# Patient Record
Sex: FEMALE | Race: WHITE | NOT HISPANIC OR LATINO | Employment: UNEMPLOYED | URBAN - METROPOLITAN AREA
[De-identification: names, ages, dates, MRNs, and addresses within clinical notes are randomized per-mention and may not be internally consistent; named-entity substitution may affect disease eponyms.]

---

## 2017-06-16 ENCOUNTER — HOSPITAL ENCOUNTER (EMERGENCY)
Facility: HOSPITAL | Age: 42
Discharge: HOME | End: 2017-06-16
Attending: EMERGENCY MEDICINE | Admitting: EMERGENCY MEDICINE

## 2017-06-16 VITALS
HEIGHT: 64 IN | WEIGHT: 100 LBS | RESPIRATION RATE: 16 BRPM | HEART RATE: 71 BPM | SYSTOLIC BLOOD PRESSURE: 101 MMHG | OXYGEN SATURATION: 96 % | TEMPERATURE: 98.3 F | BODY MASS INDEX: 17.07 KG/M2 | DIASTOLIC BLOOD PRESSURE: 70 MMHG

## 2017-06-16 DIAGNOSIS — M54.31 SCIATICA, RIGHT SIDE: Primary | ICD-10-CM

## 2017-06-16 PROCEDURE — 99283 EMERGENCY DEPT VISIT LOW MDM: CPT

## 2017-06-16 PROCEDURE — 63710000001 PREDNISONE PER 1 MG: Performed by: EMERGENCY MEDICINE

## 2017-06-16 RX ORDER — TRAMADOL HYDROCHLORIDE 50 MG/1
50 TABLET ORAL EVERY 6 HOURS PRN
COMMUNITY
End: 2020-06-10

## 2017-06-16 RX ORDER — CYCLOBENZAPRINE HCL 10 MG
10 TABLET ORAL 3 TIMES DAILY PRN
COMMUNITY
End: 2020-05-26

## 2017-06-16 RX ORDER — IBUPROFEN 600 MG/1
600 TABLET ORAL EVERY 6 HOURS PRN
COMMUNITY

## 2017-06-16 RX ORDER — OXYCODONE HYDROCHLORIDE AND ACETAMINOPHEN 5; 325 MG/1; MG/1
1 TABLET ORAL EVERY 4 HOURS PRN
Qty: 12 TABLET | Refills: 0 | Status: SHIPPED | OUTPATIENT
Start: 2017-06-16 | End: 2020-06-10

## 2017-06-16 RX ORDER — OXYCODONE AND ACETAMINOPHEN 7.5; 325 MG/1; MG/1
1 TABLET ORAL ONCE
Status: COMPLETED | OUTPATIENT
Start: 2017-06-16 | End: 2017-06-16

## 2017-06-16 RX ORDER — PREDNISONE 20 MG/1
40 TABLET ORAL ONCE
Status: COMPLETED | OUTPATIENT
Start: 2017-06-16 | End: 2017-06-16

## 2017-06-16 RX ADMIN — OXYCODONE HYDROCHLORIDE AND ACETAMINOPHEN 1 TABLET: 7.5; 325 TABLET ORAL at 13:22

## 2017-06-16 RX ADMIN — PREDNISONE 40 MG: 20 TABLET ORAL at 13:23

## 2017-06-16 NOTE — ED PROVIDER NOTES
Subjective   HPI Comments: Mrs. Opal Michaels is a 42 y.o. female who presents to the ED with c/o back pain with onset 2 days ago. While at work, Mrs. Michaels picked up a heavy box off the floor and felt a pop in her lower back. She then felt a burning pain that radiates around her waist and down the front part of her leg. The burning pain is now intermittent in timing. She has some weakness in her back, but no weakness or numbness in her legs. She also complains of right shoulder pain after lifting her heavy box. She denies any bladder or bowel incontinence. She was seen at [place] [facility] on the day of onset and was given Morphine and zofran for the pain and rx on tramadol and flexeril. She was seen at a  [facility] today where she could not bear any weight and told to come to [facility] .    Patient is a 42 y.o. female presenting with back pain.   History provided by:  Patient  Back Pain   Location:  Lumbar spine  Quality:  Burning  Radiates to:  R foot  Pain severity:  Moderate  Onset quality:  Sudden  Duration:  2 days  Timing:  Constant  Progression:  Unable to specify  Chronicity:  New  Context: lifting heavy objects    Relieved by:  None tried  Exacerbated by: Bearing weight.  Ineffective treatments:  None tried  Associated symptoms: no abdominal pain, no bladder incontinence, no bowel incontinence, no chest pain and no fever        Review of Systems   Constitutional: Negative for chills and fever.   HENT: Negative for congestion, rhinorrhea and sore throat.    Respiratory: Negative for cough.    Cardiovascular: Negative for chest pain.   Gastrointestinal: Negative for abdominal pain, bowel incontinence, diarrhea, nausea and vomiting.   Genitourinary: Negative for bladder incontinence.   Musculoskeletal: Positive for arthralgias (Right shoulder) and back pain (Lumbar). Negative for neck pain.   All other systems reviewed and are negative.      History reviewed. No pertinent past medical history.    No Known Allergies    Past  Surgical History:   Procedure Laterality Date   • CHOLECYSTECTOMY     • HYSTERECTOMY     • TUBAL ABDOMINAL LIGATION         History reviewed. No pertinent family history.    Social History     Social History   • Marital status:      Spouse name: N/A   • Number of children: N/A   • Years of education: N/A     Social History Main Topics   • Smoking status: Current Some Day Smoker   • Smokeless tobacco: None   • Alcohol use No   • Drug use: No   • Sexual activity: Defer     Other Topics Concern   • None     Social History Narrative   • None         Objective   Physical Exam   Constitutional: She is oriented to person, place, and time. She appears well-developed and well-nourished.   Appears uncomfortable    HENT:   Head: Normocephalic and atraumatic.   Eyes: Conjunctivae are normal.   Neck: Normal range of motion. Neck supple.   Pulmonary/Chest: Effort normal. No respiratory distress.   Abdominal: She exhibits no distension.   Musculoskeletal: Normal range of motion. She exhibits tenderness.   Tender diffusely in the right lumbosacral paraspinus area   Neurological: She is alert and oriented to person, place, and time. She has normal strength.   Skin: Skin is warm and dry.   Psychiatric: She has a normal mood and affect. Her behavior is normal.   Nursing note and vitals reviewed.      Procedures         ED Course  ED Course   Comment By Time   I spoke with the nurse practitioner who had just seen Mrs. Michaels.  She advised me that she is arranging an MRI and working on getting it approved through Worker's Comp. and did not intend for her to come to the emergency department just to get an MRI.  She advises me that her desire was for better pain control today.  She has already written a prescription for steroids and Neurontin.  She has written her for time off work.  Today is Friday and she already has made an appointment to follow her up on Monday.  I advised her of my plan to increase her tramadol up to oxycodone  and she is agreeable with that. Chase Foley MD 06/16 1321   I spoke with Mrs. Michaels and her  about diagnosis and treatment.  She tells me she feels better Chase Foley MD 06/16 1536                     MDM  Number of Diagnoses or Management Options  Sciatica, right side: new and requires workup     Amount and/or Complexity of Data Reviewed  Discuss the patient with other providers: yes    Patient Progress  Patient progress: improved      Final diagnoses:   Sciatica, right side       Documentation assistance provided by pee [facility].  Information recorded by the scribe was done at my direction and has been verified and validated by me.     Shay Luu  06/16/17 1322       Chase Foley MD  06/16/17 5027

## 2017-06-16 NOTE — DISCHARGE INSTRUCTIONS
No driving on the pain medication.  Return if difficulty controlling her bowels or bladder or any other concerns.  Follow-up with your provider on Monday as scheduled.

## 2020-05-18 ENCOUNTER — HOSPITAL ENCOUNTER (EMERGENCY)
Facility: HOSPITAL | Age: 45
Discharge: HOME | End: 2020-05-18
Attending: EMERGENCY MEDICINE | Admitting: EMERGENCY MEDICINE

## 2020-05-18 ENCOUNTER — APPOINTMENT (OUTPATIENT)
Dept: GENERAL RADIOLOGY | Facility: HOSPITAL | Age: 45
End: 2020-05-18

## 2020-05-18 VITALS
RESPIRATION RATE: 16 BRPM | DIASTOLIC BLOOD PRESSURE: 70 MMHG | OXYGEN SATURATION: 100 % | HEIGHT: 64 IN | SYSTOLIC BLOOD PRESSURE: 132 MMHG | TEMPERATURE: 98.5 F | BODY MASS INDEX: 18.78 KG/M2 | WEIGHT: 110 LBS | HEART RATE: 80 BPM

## 2020-05-18 DIAGNOSIS — R06.00 DYSPNEA, UNSPECIFIED TYPE: Primary | ICD-10-CM

## 2020-05-18 DIAGNOSIS — E87.6 HYPOKALEMIA: ICD-10-CM

## 2020-05-18 LAB
ALBUMIN SERPL-MCNC: 4.7 G/DL (ref 3.5–5.2)
ALBUMIN/GLOB SERPL: 2 G/DL
ALP SERPL-CCNC: 63 U/L (ref 39–117)
ALT SERPL W P-5'-P-CCNC: 6 U/L (ref 1–33)
ANION GAP SERPL CALCULATED.3IONS-SCNC: 11 MMOL/L (ref 5–15)
AST SERPL-CCNC: 12 U/L (ref 1–32)
BASOPHILS # BLD AUTO: 0.07 10*3/MM3 (ref 0–0.2)
BASOPHILS NFR BLD AUTO: 0.7 % (ref 0–1.5)
BILIRUB SERPL-MCNC: 0.2 MG/DL (ref 0.2–1.2)
BUN BLD-MCNC: 11 MG/DL (ref 6–20)
BUN/CREAT SERPL: 12.4 (ref 7–25)
CALCIUM SPEC-SCNC: 9.1 MG/DL (ref 8.6–10.5)
CHLORIDE SERPL-SCNC: 103 MMOL/L (ref 98–107)
CO2 SERPL-SCNC: 27 MMOL/L (ref 22–29)
CREAT BLD-MCNC: 0.89 MG/DL (ref 0.57–1)
DEPRECATED RDW RBC AUTO: 41.2 FL (ref 37–54)
EOSINOPHIL # BLD AUTO: 0.13 10*3/MM3 (ref 0–0.4)
EOSINOPHIL NFR BLD AUTO: 1.4 % (ref 0.3–6.2)
ERYTHROCYTE [DISTWIDTH] IN BLOOD BY AUTOMATED COUNT: 12.3 % (ref 12.3–15.4)
GFR SERPL CREATININE-BSD FRML MDRD: 69 ML/MIN/1.73
GLOBULIN UR ELPH-MCNC: 2.3 GM/DL
GLUCOSE BLD-MCNC: 93 MG/DL (ref 65–99)
HCT VFR BLD AUTO: 43.2 % (ref 34–46.6)
HGB BLD-MCNC: 14.1 G/DL (ref 12–15.9)
HOLD SPECIMEN: NORMAL
HOLD SPECIMEN: NORMAL
IMM GRANULOCYTES # BLD AUTO: 0.03 10*3/MM3 (ref 0–0.05)
IMM GRANULOCYTES NFR BLD AUTO: 0.3 % (ref 0–0.5)
LYMPHOCYTES # BLD AUTO: 3.52 10*3/MM3 (ref 0.7–3.1)
LYMPHOCYTES NFR BLD AUTO: 37.1 % (ref 19.6–45.3)
MCH RBC QN AUTO: 29.9 PG (ref 26.6–33)
MCHC RBC AUTO-ENTMCNC: 32.6 G/DL (ref 31.5–35.7)
MCV RBC AUTO: 91.7 FL (ref 79–97)
MONOCYTES # BLD AUTO: 0.64 10*3/MM3 (ref 0.1–0.9)
MONOCYTES NFR BLD AUTO: 6.7 % (ref 5–12)
NEUTROPHILS # BLD AUTO: 5.1 10*3/MM3 (ref 1.7–7)
NEUTROPHILS NFR BLD AUTO: 53.8 % (ref 42.7–76)
NRBC BLD AUTO-RTO: 0 /100 WBC (ref 0–0.2)
NT-PROBNP SERPL-MCNC: 48.8 PG/ML (ref 5–450)
PLATELET # BLD AUTO: 268 10*3/MM3 (ref 140–450)
PMV BLD AUTO: 11.6 FL (ref 6–12)
POTASSIUM BLD-SCNC: 3.4 MMOL/L (ref 3.5–5.2)
PROT SERPL-MCNC: 7 G/DL (ref 6–8.5)
RBC # BLD AUTO: 4.71 10*6/MM3 (ref 3.77–5.28)
SODIUM BLD-SCNC: 141 MMOL/L (ref 136–145)
TROPONIN T SERPL-MCNC: <0.01 NG/ML (ref 0–0.03)
TROPONIN T SERPL-MCNC: <0.01 NG/ML (ref 0–0.03)
WBC NRBC COR # BLD: 9.49 10*3/MM3 (ref 3.4–10.8)
WHOLE BLOOD HOLD SPECIMEN: NORMAL
WHOLE BLOOD HOLD SPECIMEN: NORMAL

## 2020-05-18 PROCEDURE — 99283 EMERGENCY DEPT VISIT LOW MDM: CPT

## 2020-05-18 PROCEDURE — 25010000002 KETOROLAC TROMETHAMINE PER 15 MG: Performed by: EMERGENCY MEDICINE

## 2020-05-18 PROCEDURE — 93005 ELECTROCARDIOGRAM TRACING: CPT | Performed by: EMERGENCY MEDICINE

## 2020-05-18 PROCEDURE — 84484 ASSAY OF TROPONIN QUANT: CPT | Performed by: EMERGENCY MEDICINE

## 2020-05-18 PROCEDURE — 80053 COMPREHEN METABOLIC PANEL: CPT | Performed by: EMERGENCY MEDICINE

## 2020-05-18 PROCEDURE — 83880 ASSAY OF NATRIURETIC PEPTIDE: CPT | Performed by: EMERGENCY MEDICINE

## 2020-05-18 PROCEDURE — 71045 X-RAY EXAM CHEST 1 VIEW: CPT

## 2020-05-18 PROCEDURE — 96374 THER/PROPH/DIAG INJ IV PUSH: CPT

## 2020-05-18 PROCEDURE — 85025 COMPLETE CBC W/AUTO DIFF WBC: CPT | Performed by: EMERGENCY MEDICINE

## 2020-05-18 RX ORDER — POTASSIUM CHLORIDE 750 MG/1
20 CAPSULE, EXTENDED RELEASE ORAL ONCE
Status: COMPLETED | OUTPATIENT
Start: 2020-05-18 | End: 2020-05-18

## 2020-05-18 RX ORDER — SODIUM CHLORIDE 0.9 % (FLUSH) 0.9 %
10 SYRINGE (ML) INJECTION AS NEEDED
Status: DISCONTINUED | OUTPATIENT
Start: 2020-05-18 | End: 2020-05-18 | Stop reason: HOSPADM

## 2020-05-18 RX ORDER — KETOROLAC TROMETHAMINE 15 MG/ML
15 INJECTION, SOLUTION INTRAMUSCULAR; INTRAVENOUS ONCE
Status: COMPLETED | OUTPATIENT
Start: 2020-05-18 | End: 2020-05-18

## 2020-05-18 RX ADMIN — KETOROLAC TROMETHAMINE 15 MG: 15 INJECTION, SOLUTION INTRAMUSCULAR; INTRAVENOUS at 04:45

## 2020-05-18 RX ADMIN — POTASSIUM CHLORIDE 20 MEQ: 10 CAPSULE, COATED, EXTENDED RELEASE ORAL at 04:04

## 2020-05-18 NOTE — DISCHARGE INSTRUCTIONS
Follow up with one of the [facility] Primary Care Providers below to setup primary care. If you need assistance coordinating a primary care appointment with a [facility] Primary Care Provider, please contact the Primary Care Coordinators at [phone] for appointment scheduling.    [facility], Primary Care   [address] , [address]   [place], [place] [address] [phone]    [facility] Internal Medicine & Endocrinology  [address], [address]  [place] [address] [phone]    [facility] Family Medicine  [address], [address]   [place] [address] [phone]    [facility] Primary Care  [address], [address]  [place], [place] [address] [phone]    [facility], Primary Care,   [address], [address]   [place], [place] [address] [phone]    [facility] Primary Care  [address]  [place], [place] [id number] [phone]    [facility], Primary Care  [address], [address]  [place], [place] [address] [phone]    [facility] Internal Medicine & Pediatrics  [address], [address]   [place] [address] [phone]    [facility], Primary Care  [address] [place] [address] [phone]      [facility] Primary Care  [address], [address]   [place] [address] [phone]    [facility] Family Medicine  [address] [place] [address] [phone]

## 2020-05-18 NOTE — ED PROVIDER NOTES
Subjective   45-year-old female presents for evaluation of shortness of breath.  She states that a few hours ago she was having a difficult time sleeping as she began experiencing shortness of breath that was worse with lying flat and improved with sitting up.  She endorses mild accompanying chest discomfort as well.  No wheezing.  No cough or fever.  She is unsure as to what may have triggered her symptoms.  She states that she has a history of anxiety and feels that this could be a contributing factor.  The chest pain is quite mild at this time and her biggest complaint is dyspnea.          Review of Systems   Constitutional: Negative for fever.   Respiratory: Positive for shortness of breath. Negative for cough and wheezing.    Cardiovascular: Positive for chest pain.   All other systems reviewed and are negative.      No past medical history on file.    No Known Allergies    Past Surgical History:   Procedure Laterality Date   • CHOLECYSTECTOMY     • HYSTERECTOMY     • TUBAL ABDOMINAL LIGATION         No family history on file.    Social History     Socioeconomic History   • Marital status:      Spouse name: Not on file   • Number of children: Not on file   • Years of education: Not on file   • Highest education level: Not on file   Tobacco Use   • Smoking status: Current Some Day Smoker   Substance and Sexual Activity   • Alcohol use: No   • Drug use: No   • Sexual activity: Defer           Objective   Physical Exam   Constitutional: She is oriented to person, place, and time. She appears well-developed and well-nourished. No distress.   Well appearing female in no acute distress   HENT:   Head: Normocephalic and atraumatic.   Mouth/Throat: Oropharynx is clear and moist.   Eyes: Pupils are equal, round, and reactive to light. EOM are normal.   Neck: Normal range of motion. Neck supple.   Cardiovascular: Normal rate, regular rhythm and normal heart sounds. Exam reveals no gallop and no friction rub.   No  murmur heard.  Pulmonary/Chest: Effort normal and breath sounds normal. No respiratory distress. She has no wheezes. She has no rales.   Abdominal: Soft. Bowel sounds are normal. She exhibits no distension and no mass. There is no tenderness. There is no rebound and no guarding.   Musculoskeletal: Normal range of motion.        Right lower leg: She exhibits no edema.        Left lower leg: She exhibits no edema.   Neurological: She is alert and oriented to person, place, and time.   Skin: Skin is warm and dry. No rash noted. She is not diaphoretic. No erythema.   Psychiatric: She has a normal mood and affect. Judgment and thought content normal.   Nursing note and vitals reviewed.      Procedures           ED Course  ED Course as of May 18 0524   Mon May 18, 2020   0300 45-year-old female presents complaining of positional shortness of breath and chest pain this morning.  On arrival to the ED, patient well-appearing.  Benign exam.  Vital signs are reassuring.  She has no cardiac risk factors.  Initial EKG revealed normal sinus rhythm with a heart rate of 78 and no ST segments suggestive of or concerning for ischemia.  Low risk Well's and PERC negative.    [DD]   0314 Chest x-ray negative.    [DD]   0324 Labs remarkable only for mild hypokalemia.  Potassium replaced orally in the ED.    [DD]   0431 HEART score of 0.    [DD]   0507 Upon reevaluation, patient improved.Repeat troponin/EKG negative/unchanged.  Doubt ACS, PE, dissection, or emergent cardiothoracic process at this time based on exam, history, clinical presentation, gestalt, objective findings in the ED, and risk stratification.  The patient will follow up with the chest pain clinic within the next 72 hours for further outpatient work-up and evaluation.  Agreeable with plan and given appropriate strict return precautions.  We discussed the high likelihood of pleuritic/musculoskeletal nature of the patient's chest pain.  We also discussed the possibility of  early pericarditis.        [DD]      ED Course User Index  [DD] Dejan, Jonh Malik MD                                   Recent Results (from the past 24 hour(s))   Light Blue Top    Collection Time: 05/18/20  2:30 AM   Result Value Ref Range    Extra Tube hold for add-on    Lavender Top    Collection Time: 05/18/20  2:30 AM   Result Value Ref Range    Extra Tube hold for add-on    CBC Auto Differential    Collection Time: 05/18/20  2:30 AM   Result Value Ref Range    WBC 9.49 3.40 - 10.80 10*3/mm3    RBC 4.71 3.77 - 5.28 10*6/mm3    Hemoglobin 14.1 12.0 - 15.9 g/dL    Hematocrit 43.2 34.0 - 46.6 %    MCV 91.7 79.0 - 97.0 fL    MCH 29.9 26.6 - 33.0 pg    MCHC 32.6 31.5 - 35.7 g/dL    RDW 12.3 12.3 - 15.4 %    RDW-SD 41.2 37.0 - 54.0 fl    MPV 11.6 6.0 - 12.0 fL    Platelets 268 140 - 450 10*3/mm3    Neutrophil % 53.8 42.7 - 76.0 %    Lymphocyte % 37.1 19.6 - 45.3 %    Monocyte % 6.7 5.0 - 12.0 %    Eosinophil % 1.4 0.3 - 6.2 %    Basophil % 0.7 0.0 - 1.5 %    Immature Grans % 0.3 0.0 - 0.5 %    Neutrophils, Absolute 5.10 1.70 - 7.00 10*3/mm3    Lymphocytes, Absolute 3.52 (H) 0.70 - 3.10 10*3/mm3    Monocytes, Absolute 0.64 0.10 - 0.90 10*3/mm3    Eosinophils, Absolute 0.13 0.00 - 0.40 10*3/mm3    Basophils, Absolute 0.07 0.00 - 0.20 10*3/mm3    Immature Grans, Absolute 0.03 0.00 - 0.05 10*3/mm3    nRBC 0.0 0.0 - 0.2 /100 WBC   Gold Top - SST    Collection Time: 05/18/20  2:56 AM   Result Value Ref Range    Extra Tube Hold for add-ons.    Comprehensive Metabolic Panel    Collection Time: 05/18/20  2:57 AM   Result Value Ref Range    Glucose 93 65 - 99 mg/dL    BUN 11 6 - 20 mg/dL    Creatinine 0.89 0.57 - 1.00 mg/dL    Sodium 141 136 - 145 mmol/L    Potassium 3.4 (L) 3.5 - 5.2 mmol/L    Chloride 103 98 - 107 mmol/L    CO2 27.0 22.0 - 29.0 mmol/L    Calcium 9.1 8.6 - 10.5 mg/dL    Total Protein 7.0 6.0 - 8.5 g/dL    Albumin 4.70 3.50 - 5.20 g/dL    ALT (SGPT) 6 1 - 33 U/L    AST (SGOT) 12 1 - 32 U/L    Alkaline  "Phosphatase 63 39 - 117 U/L    Total Bilirubin 0.2 0.2 - 1.2 mg/dL    eGFR Non African Amer 69 >60 mL/min/1.73    Globulin 2.3 gm/dL    A/G Ratio 2.0 g/dL    BUN/Creatinine Ratio 12.4 7.0 - 25.0    Anion Gap 11.0 5.0 - 15.0 mmol/L   BNP    Collection Time: 05/18/20  2:57 AM   Result Value Ref Range    proBNP 48.8 5.0 - 450.0 pg/mL   Troponin    Collection Time: 05/18/20  2:57 AM   Result Value Ref Range    Troponin T <0.010 0.000 - 0.030 ng/mL   Green Top (Gel)    Collection Time: 05/18/20  2:57 AM   Result Value Ref Range    Extra Tube Hold for add-ons.    Troponin    Collection Time: 05/18/20  4:35 AM   Result Value Ref Range    Troponin T <0.010 0.000 - 0.030 ng/mL     Note: In addition to lab results from this visit, the labs listed above may include labs taken at another facility or during a different encounter within the last 24 hours. Please correlate lab times with ED admission and discharge times for further clarification of the services performed during this visit.    XR Chest 1 View   Final Result   Hyperinflation may be due to COPD or asthma. No infiltrates or pneumothorax.      Signer Name: Nitesh Griffin MD    Signed: 5/18/2020 3:01 AM    Workstation Name: [email or web address]     [facility]        Vitals:    05/18/20 0146   BP: 140/81   BP Location: Right arm   Patient Position: Sitting   Pulse: 83   Resp: 18   Temp: 98.5 °F (36.9 °C)   TempSrc: Oral   SpO2: 99%   Weight: 49.9 kg (110 lb)   Height: 162.6 cm (64\")     Medications   sodium chloride 0.9 % flush 10 mL (has no administration in time range)   potassium chloride (MICRO-K) CR capsule 20 mEq (20 mEq Oral Given 5/18/20 7644)   ketorolac (TORADOL) injection 15 mg (15 mg Intravenous Given 5/18/20 0375)     ECG/EMG Results (last 24 hours)     Procedure Component Value Units Date/Time    ECG 12 Lead [[id number]] Collected:  05/18/20 0206     Updated:  05/18/20 0207    ECG 12 Lead [[id number]] Collected:  05/18/20 0439     Updated:  " 05/18/20 0439        ECG 12 Lead         ECG 12 Lead                     [facility]    Final diagnoses:   Dyspnea, unspecified type   Hypokalemia            Jonh Wylie MD  05/18/20 4610

## 2020-05-19 ENCOUNTER — TELEPHONE (OUTPATIENT)
Dept: FAMILY MEDICINE CLINIC | Facility: CLINIC | Age: 45
End: 2020-05-19

## 2020-05-19 NOTE — TELEPHONE ENCOUNTER
Patient needing to schedule a New Patient/ER Follow up. She was seen for shortness of breath and chest pain.     Please call patient to schedule an appt  [phone]

## 2020-05-22 NOTE — PROGRESS NOTES
[facility]  Heart and Valve Center      05/26/2020         Opal Michaels  [address] [address]  [unfilled]    1975    Provider, No Known    Opal Michaels is a 45 y.o. female.      Subjective:     Chief Complaint:  Chest Pain and Establish Care       HPI   45-year-old female with history of anxiety who presents today for evaluation of chest pain at the request of Dr. Wylie.  Patient presented the ED on 5/18 with complaints of shortness of breath that was worse with lying flat and improved when sitting up.  She notes associated mild chest discomfort.  She does have a history of anxiety feels may be a contributing factor.  Chest pain is very mild and her main concern is shortness of breath.  Evaluation in ED including EKGs and troponins were negative.  Chest x-ray negative.  Labs remarkable only for mild hypokalemia.  She reports worsening chest pain. Symptoms started months ago. Symptoms occur daily. No triggering or relieving factors. Describes as a sharp pain. Happens at rest and with activity. She still has trouble taking in a deep breath. Ibuprofen occasionally helps.   That she used to be a runner but stopped a couple months ago due to extreme exertional fatigue.  She now is afraid to exercise because of the chest pain    Cardiac risks: Tobacco abuse (quit 5/4)    There is no problem list on file for this patient.      History reviewed. No pertinent past medical history.    Past Surgical History:   Procedure Laterality Date   • CHOLECYSTECTOMY     • HYSTERECTOMY     • TUBAL ABDOMINAL LIGATION         Family History   Problem Relation Age of Onset   • Hyperlipidemia Mother    • Hypertension Mother    • Hyperlipidemia Father    • Hypertension Father    • No Known Problems Sister    • No Known Problems Brother    • Diabetes Maternal Grandmother    • Diabetes Maternal Grandfather    • Stroke Paternal Grandmother    • Cancer Paternal Grandfather        Social History      Socioeconomic History   • Marital status:      Spouse name: Not on file   • Number of children: Not on file   • Years of education: Not on file   • Highest education level: Not on file   Tobacco Use   • Smoking status: Current Some Day Smoker   • Smokeless tobacco: Never Used   Substance and Sexual Activity   • Alcohol use: No   • Drug use: No   • Sexual activity: Defer   Social History Narrative    Caffeine: 3 sodas daily       No Known Allergies      Current Outpatient Medications:   •  ARIPiprazole (ABILIFY PO), Take 4 mg by mouth Daily., Disp: , Rfl:   •  Buprenorphine HCl-Naloxone HCl (SUBOXONE SL), Place 8 mg under the tongue Daily., Disp: , Rfl:   •  buPROPion HCl (WELLBUTRIN PO), Take 150 mg by mouth Daily., Disp: , Rfl:   •  ibuprofen (ADVIL,MOTRIN) 600 MG tablet, Take 600 mg by mouth Every 6 (Six) Hours As Needed for Mild Pain (1-3)., Disp: , Rfl:   •  Varenicline Tartrate (CHANTIX PO), Take  by mouth., Disp: , Rfl:   •  oxyCODONE-acetaminophen (PERCOCET) 5-325 MG per tablet, Take 1 tablet by mouth Every 4 (Four) Hours As Needed for Severe Pain (7-10)., Disp: 12 tablet, Rfl: 0  •  traMADol (ULTRAM) 50 MG tablet, Take 50 mg by mouth Every 6 (Six) Hours As Needed for Moderate Pain (4-6)., Disp: , Rfl:     The following portions of the patient's history were reviewed today and updated as appropriate: allergies, current medications, past family history, past medical history, past social history, past surgical history and problem list     Review of Systems   Constitution: Negative for chills and fever.   HENT: Negative.    Eyes: Negative.    Cardiovascular: Positive for chest pain and dyspnea on exertion. Negative for claudication, cyanosis, irregular heartbeat, leg swelling, near-syncope, orthopnea, palpitations, paroxysmal nocturnal dyspnea and syncope.   Respiratory: Negative for cough, shortness of breath and snoring.    Endocrine: Negative.    Hematologic/Lymphatic: Does not bruise/bleed easily.  "  Skin: Negative for poor wound healing.   Musculoskeletal: Negative.    Gastrointestinal: Negative for abdominal pain, heartburn, hematemesis, melena, nausea and vomiting.   Genitourinary: Negative.  Negative for hematuria.   Neurological: Negative.    Psychiatric/Behavioral: Negative.    Allergic/Immunologic: Negative.        Objective:     Vitals:    05/26/20 0850 05/26/20 0851 05/26/20 0852   BP: 106/64 120/62 121/66   BP Location: Left arm Left arm Right arm   Patient Position: Standing Sitting Sitting   Cuff Size: Adult Adult Adult   Pulse: 76 66 70   Resp:   16   Temp:   97.8 °F (36.6 °C)   TempSrc:   Temporal   SpO2: 100% 100% 100%   Weight:   53.1 kg (117 lb 2 oz)   Height:   162.6 cm (64\")       Body mass index is 20.1 kg/m².    Physical Exam   Constitutional: She is oriented to person, place, and time. She appears well-developed and well-nourished. No distress.   HENT:   Head: Normocephalic.   Eyes: Pupils are equal, round, and reactive to light. Conjunctivae are normal.   Neck: Neck supple. No JVD present. No thyromegaly present.   Cardiovascular: Normal rate, regular rhythm, normal heart sounds and intact distal pulses. Exam reveals no gallop and no friction rub.   No murmur heard.  Pulmonary/Chest: Effort normal and breath sounds normal. No respiratory distress. She has no wheezes. She has no rales. She exhibits no tenderness.   Abdominal: Soft. Bowel sounds are normal.   Musculoskeletal: Normal range of motion. She exhibits no edema.   Neurological: She is alert and oriented to person, place, and time.   Skin: Skin is warm and dry.   Psychiatric: She has a normal mood and affect. Her behavior is normal. Thought content normal.   Vitals reviewed.      Lab and Diagnostic Review:  Results for orders placed or performed during the hospital encounter of 05/18/20   Comprehensive Metabolic Panel   Result Value Ref Range    Glucose 93 65 - 99 mg/dL    BUN 11 6 - 20 mg/dL    Creatinine 0.89 0.57 - 1.00 mg/dL "    Sodium 141 136 - 145 mmol/L    Potassium 3.4 (L) 3.5 - 5.2 mmol/L    Chloride 103 98 - 107 mmol/L    CO2 27.0 22.0 - 29.0 mmol/L    Calcium 9.1 8.6 - 10.5 mg/dL    Total Protein 7.0 6.0 - 8.5 g/dL    Albumin 4.70 3.50 - 5.20 g/dL    ALT (SGPT) 6 1 - 33 U/L    AST (SGOT) 12 1 - 32 U/L    Alkaline Phosphatase 63 39 - 117 U/L    Total Bilirubin 0.2 0.2 - 1.2 mg/dL    eGFR Non African Amer 69 >60 mL/min/1.73    Globulin 2.3 gm/dL    A/G Ratio 2.0 g/dL    BUN/Creatinine Ratio 12.4 7.0 - 25.0    Anion Gap 11.0 5.0 - 15.0 mmol/L   BNP   Result Value Ref Range    proBNP 48.8 5.0 - 450.0 pg/mL   Troponin   Result Value Ref Range    Troponin T <0.010 0.000 - 0.030 ng/mL   CBC Auto Differential   Result Value Ref Range    WBC 9.49 3.40 - 10.80 10*3/mm3    RBC 4.71 3.77 - 5.28 10*6/mm3    Hemoglobin 14.1 12.0 - 15.9 g/dL    Hematocrit 43.2 34.0 - 46.6 %    MCV 91.7 79.0 - 97.0 fL    MCH 29.9 26.6 - 33.0 pg    MCHC 32.6 31.5 - 35.7 g/dL    RDW 12.3 12.3 - 15.4 %    RDW-SD 41.2 37.0 - 54.0 fl    MPV 11.6 6.0 - 12.0 fL    Platelets 268 140 - 450 10*3/mm3    Neutrophil % 53.8 42.7 - 76.0 %    Lymphocyte % 37.1 19.6 - 45.3 %    Monocyte % 6.7 5.0 - 12.0 %    Eosinophil % 1.4 0.3 - 6.2 %    Basophil % 0.7 0.0 - 1.5 %    Immature Grans % 0.3 0.0 - 0.5 %    Neutrophils, Absolute 5.10 1.70 - 7.00 10*3/mm3    Lymphocytes, Absolute 3.52 (H) 0.70 - 3.10 10*3/mm3    Monocytes, Absolute 0.64 0.10 - 0.90 10*3/mm3    Eosinophils, Absolute 0.13 0.00 - 0.40 10*3/mm3    Basophils, Absolute 0.07 0.00 - 0.20 10*3/mm3    Immature Grans, Absolute 0.03 0.00 - 0.05 10*3/mm3    nRBC 0.0 0.0 - 0.2 /100 WBC   Troponin   Result Value Ref Range    Troponin T <0.010 0.000 - 0.030 ng/mL     CXR 5/18/20  IMPRESSION:  Hyperinflation may be due to COPD or asthma. No infiltrates or pneumothorax.    EKG 5/18/20: Normal sinus rhythm  Normal ECG  When compared with ECG of 18-MAY-2020 02:06, (Unconfirmed)  No significant change was found    Assessment and Plan:    1. Chest pain, unspecified type  LUISA risk score 0  No evidence of pericarditis on EKG. However, recommend trial of naproxen 500mg BID for 2 weeks to see if this helps  - Lipid Panel; Future  - High Sensitivity CRP; Future  - Adult Transthoracic Echo Complete W/ Cont if Necessary Per Protocol; Future    2. Shortness of breath  - Adult Transthoracic Echo Complete W/ Cont if Necessary Per Protocol; Future    3. Other fatigue  - Thyroid Panel With TSH; Future    4. Palpitations  - Magnesium; Future  - Holter Monitor - 72 Hour Up To 21 Days; Future  - Adult Transthoracic Echo Complete W/ Cont if Necessary Per Protocol; Future    Video visit in 6 weeks      It has been a pleasure to participate in the care of this patient.  Patient was instructed to call the [facility] with any questions, concerns, or worsening symptoms.    *Please note that portions of this note were completed with a voice recognition program. Efforts were made to edit the dictations, but occasionally words are mistranscribed.

## 2020-05-26 ENCOUNTER — APPOINTMENT (OUTPATIENT)
Dept: CARDIOLOGY | Facility: HOSPITAL | Age: 45
End: 2020-05-26

## 2020-05-26 ENCOUNTER — OFFICE VISIT (OUTPATIENT)
Dept: CARDIOLOGY | Facility: HOSPITAL | Age: 45
End: 2020-05-26

## 2020-05-26 ENCOUNTER — HOSPITAL ENCOUNTER (OUTPATIENT)
Dept: CARDIOLOGY | Facility: HOSPITAL | Age: 45
Discharge: HOME | End: 2020-05-26
Admitting: NURSE PRACTITIONER

## 2020-05-26 ENCOUNTER — LAB (OUTPATIENT)
Dept: LAB | Facility: HOSPITAL | Age: 45
End: 2020-05-26

## 2020-05-26 VITALS
OXYGEN SATURATION: 100 % | SYSTOLIC BLOOD PRESSURE: 121 MMHG | RESPIRATION RATE: 16 BRPM | TEMPERATURE: 97.8 F | WEIGHT: 117.13 LBS | DIASTOLIC BLOOD PRESSURE: 66 MMHG | HEIGHT: 64 IN | BODY MASS INDEX: 20 KG/M2 | HEART RATE: 70 BPM

## 2020-05-26 DIAGNOSIS — R00.2 PALPITATIONS: ICD-10-CM

## 2020-05-26 DIAGNOSIS — R06.02 SHORTNESS OF BREATH: ICD-10-CM

## 2020-05-26 DIAGNOSIS — R53.83 OTHER FATIGUE: ICD-10-CM

## 2020-05-26 DIAGNOSIS — R07.9 CHEST PAIN, UNSPECIFIED TYPE: Primary | ICD-10-CM

## 2020-05-26 DIAGNOSIS — R07.9 CHEST PAIN, UNSPECIFIED TYPE: ICD-10-CM

## 2020-05-26 LAB
CHOLEST SERPL-MCNC: 156 MG/DL (ref 0–200)
CRP SERPL-MCNC: 0.05 MG/DL (ref 0.01–0.5)
HDLC SERPL-MCNC: 67 MG/DL (ref 40–60)
LDLC SERPL CALC-MCNC: 64 MG/DL (ref 0–100)
LDLC/HDLC SERPL: 0.95 {RATIO}
MAGNESIUM SERPL-MCNC: 1.9 MG/DL (ref 1.6–2.6)
T-UPTAKE NFR SERPL: 1.12 TBI (ref 0.8–1.3)
T4 SERPL-MCNC: 5.66 MCG/DL (ref 4.5–11.7)
TRIGL SERPL-MCNC: 127 MG/DL (ref 0–150)
TSH SERPL DL<=0.05 MIU/L-ACNC: 2.48 UIU/ML (ref 0.27–4.2)
VLDLC SERPL-MCNC: 25.4 MG/DL (ref 5–40)

## 2020-05-26 PROCEDURE — 83735 ASSAY OF MAGNESIUM: CPT

## 2020-05-26 PROCEDURE — 0296T HC EXT ECG > 48HR TO 21 DAY RCRD W/CONECT INTL RCRD: CPT

## 2020-05-26 PROCEDURE — 80061 LIPID PANEL: CPT

## 2020-05-26 PROCEDURE — 36415 COLL VENOUS BLD VENIPUNCTURE: CPT

## 2020-05-26 PROCEDURE — 86141 C-REACTIVE PROTEIN HS: CPT

## 2020-05-26 PROCEDURE — 84443 ASSAY THYROID STIM HORMONE: CPT

## 2020-05-26 PROCEDURE — 99204 OFFICE O/P NEW MOD 45 MIN: CPT | Performed by: NURSE PRACTITIONER

## 2020-05-26 PROCEDURE — 84479 ASSAY OF THYROID (T3 OR T4): CPT

## 2020-05-26 PROCEDURE — 84436 ASSAY OF TOTAL THYROXINE: CPT

## 2020-05-26 NOTE — PROGRESS NOTES
[facility] Heart Monitor Documentation    Opal Michaels  1975  [id number]  05/26/20    OZ Mcintosh    [] ZIO XT Patch  Model [id number] Prescribed for N/A Days    · Serial Number: (N + 9 Digits) N   · Apply-By Date on Box:   · USPS Tracking Number:   · USPS Tracking        [] [employer] BodyGuardian MINI PLUS Mobile Cardiac Telemetry  Model BGMINIPLUS Prescribed for  Days    · Serial Number: (BGM + 7 Digits) BGM  · Shipped-By Date on Box:   · UPS Tracking Number: [id number]  · UPS Tracking      [x] [employer] BodyGuardian MINI Holter Monitor  Model BGMINIEL Prescribed for 14 Days    · Serial Number: (7 Digits) [id number]  · Shipped-By Date on Box: 03/24/2020  · UPS Tracking Number: [id number]  · UPS Tracking        This monitor was applied to the patient's chest and checked for proper functioning.  Ms. Opal Michaels was instructed in the proper use of this monitor.  She was given the opportunity to ask questions and left the office with the device 's instruction manual.    Melissa Roth CMA, 9:31 AM, 05/26/20                  [facility] 8[facility]

## 2020-05-29 ENCOUNTER — HOSPITAL ENCOUNTER (OUTPATIENT)
Dept: CARDIOLOGY | Facility: HOSPITAL | Age: 45
Discharge: HOME | End: 2020-05-29
Admitting: NURSE PRACTITIONER

## 2020-05-29 VITALS — HEIGHT: 64 IN | BODY MASS INDEX: 19.97 KG/M2 | WEIGHT: 117 LBS

## 2020-05-29 DIAGNOSIS — R00.2 PALPITATIONS: ICD-10-CM

## 2020-05-29 DIAGNOSIS — R06.02 SHORTNESS OF BREATH: ICD-10-CM

## 2020-05-29 DIAGNOSIS — R07.9 CHEST PAIN, UNSPECIFIED TYPE: ICD-10-CM

## 2020-05-29 LAB
BH CV ECHO MEAS - AI DEC SLOPE: 220.9 CM/SEC^2
BH CV ECHO MEAS - AI MAX PG: 61.5 MMHG
BH CV ECHO MEAS - AI MAX VEL: 392.1 CM/SEC
BH CV ECHO MEAS - AI P1/2T: 520 MSEC
BH CV ECHO MEAS - AO MAX PG (FULL): 1.8 MMHG
BH CV ECHO MEAS - AO MAX PG: 5.8 MMHG
BH CV ECHO MEAS - AO ROOT AREA (BSA CORRECTED): 1.7
BH CV ECHO MEAS - AO ROOT AREA: 5.4 CM^2
BH CV ECHO MEAS - AO ROOT DIAM: 2.6 CM
BH CV ECHO MEAS - AO V2 MAX: 120.7 CM/SEC
BH CV ECHO MEAS - AVA(V,A): 2.3 CM^2
BH CV ECHO MEAS - AVA(V,D): 2.3 CM^2
BH CV ECHO MEAS - BSA(HAYCOCK): 1.5 M^2
BH CV ECHO MEAS - BSA: 1.6 M^2
BH CV ECHO MEAS - BZI_BMI: 20.1 KILOGRAMS/M^2
BH CV ECHO MEAS - BZI_METRIC_HEIGHT: 162.6 CM
BH CV ECHO MEAS - BZI_METRIC_WEIGHT: 53.1 KG
BH CV ECHO MEAS - EDV(CUBED): 80.2 ML
BH CV ECHO MEAS - EDV(MOD-SP2): 46 ML
BH CV ECHO MEAS - EDV(MOD-SP4): 58 ML
BH CV ECHO MEAS - EDV(TEICH): 83.7 ML
BH CV ECHO MEAS - EF(CUBED): 55 %
BH CV ECHO MEAS - EF(MOD-BP): 62 %
BH CV ECHO MEAS - EF(MOD-SP2): 63 %
BH CV ECHO MEAS - EF(MOD-SP4): 62.1 %
BH CV ECHO MEAS - EF(TEICH): 47.1 %
BH CV ECHO MEAS - ESV(CUBED): 36.1 ML
BH CV ECHO MEAS - ESV(MOD-SP2): 17 ML
BH CV ECHO MEAS - ESV(MOD-SP4): 22 ML
BH CV ECHO MEAS - ESV(TEICH): 44.3 ML
BH CV ECHO MEAS - FS: 23.4 %
BH CV ECHO MEAS - IVS/LVPW: 0.9
BH CV ECHO MEAS - IVSD: 0.61 CM
BH CV ECHO MEAS - LA DIMENSION: 2.3 CM
BH CV ECHO MEAS - LA/AO: 0.87
BH CV ECHO MEAS - LAD MAJOR: 4.4 CM
BH CV ECHO MEAS - LAT PEAK E' VEL: 14.1 CM/SEC
BH CV ECHO MEAS - LV DIASTOLIC VOL/BSA (35-75): 37.2 ML/M^2
BH CV ECHO MEAS - LV MASS(C)D: 80.8 GRAMS
BH CV ECHO MEAS - LV MASS(C)DI: 51.9 GRAMS/M^2
BH CV ECHO MEAS - LV MAX PG: 4.1 MMHG
BH CV ECHO MEAS - LV MEAN PG: 2.2 MMHG
BH CV ECHO MEAS - LV SYSTOLIC VOL/BSA (12-30): 14.1 ML/M^2
BH CV ECHO MEAS - LV V1 MAX: 100.9 CM/SEC
BH CV ECHO MEAS - LV V1 MEAN: 69.1 CM/SEC
BH CV ECHO MEAS - LV V1 VTI: 17.3 CM
BH CV ECHO MEAS - LVIDD: 4.3 CM
BH CV ECHO MEAS - LVIDS: 3.3 CM
BH CV ECHO MEAS - LVLD AP2: 5.9 CM
BH CV ECHO MEAS - LVLD AP4: 6.2 CM
BH CV ECHO MEAS - LVLS AP2: 5 CM
BH CV ECHO MEAS - LVLS AP4: 5.1 CM
BH CV ECHO MEAS - LVOT AREA (M): 2.8 CM^2
BH CV ECHO MEAS - LVOT AREA: 2.7 CM^2
BH CV ECHO MEAS - LVOT DIAM: 1.9 CM
BH CV ECHO MEAS - LVPWD: 0.68 CM
BH CV ECHO MEAS - MED PEAK E' VEL: 10 CM/SEC
BH CV ECHO MEAS - MV DEC SLOPE: 236.8 CM/SEC^2
BH CV ECHO MEAS - MV MAX PG: 3 MMHG
BH CV ECHO MEAS - MV MEAN PG: 1.5 MMHG
BH CV ECHO MEAS - MV P1/2T MAX VEL: 87.8 CM/SEC
BH CV ECHO MEAS - MV P1/2T: 108.6 MSEC
BH CV ECHO MEAS - MV V2 MAX: 86.4 CM/SEC
BH CV ECHO MEAS - MV V2 MEAN: 57.5 CM/SEC
BH CV ECHO MEAS - MV V2 VTI: 22.3 CM
BH CV ECHO MEAS - MVA P1/2T LCG: 2.5 CM^2
BH CV ECHO MEAS - MVA(P1/2T): 2 CM^2
BH CV ECHO MEAS - MVA(VTI): 2.1 CM^2
BH CV ECHO MEAS - PA ACC SLOPE: 391.1 CM/SEC^2
BH CV ECHO MEAS - PA ACC TIME: 0.15 SEC
BH CV ECHO MEAS - PA MAX PG: 2.2 MMHG
BH CV ECHO MEAS - PA PR(ACCEL): 11.7 MMHG
BH CV ECHO MEAS - PA V2 MAX: 74.8 CM/SEC
BH CV ECHO MEAS - RAP SYSTOLE: 3 MMHG
BH CV ECHO MEAS - RVSP: 19 MMHG
BH CV ECHO MEAS - SI(CUBED): 28.3 ML/M^2
BH CV ECHO MEAS - SI(LVOT): 30.4 ML/M^2
BH CV ECHO MEAS - SI(MOD-SP2): 18.6 ML/M^2
BH CV ECHO MEAS - SI(MOD-SP4): 23.1 ML/M^2
BH CV ECHO MEAS - SI(TEICH): 25.3 ML/M^2
BH CV ECHO MEAS - SV(CUBED): 44.2 ML
BH CV ECHO MEAS - SV(LVOT): 47.3 ML
BH CV ECHO MEAS - SV(MOD-SP2): 29 ML
BH CV ECHO MEAS - SV(MOD-SP4): 36 ML
BH CV ECHO MEAS - SV(TEICH): 39.4 ML
BH CV ECHO MEAS - TAPSE (>1.6): 1.9 CM2
BH CV ECHO MEAS - TR MAX PG: 16 MMHG
BH CV ECHO MEAS - TR MAX VEL: 197.2 CM/SEC
BH CV VAS BP LEFT ARM: NORMAL MMHG
BH CV XLRA - RV BASE: 2.5 CM
BH CV XLRA - RV LENGTH: 5.9 CM
BH CV XLRA - RV MID: 1.9 CM
BH CV XLRA - TDI S': 16.4 CM/SEC
LEFT ATRIUM VOLUME INDEX: 19.3 ML/M^2
LEFT ATRIUM VOLUME: 30 ML

## 2020-05-29 PROCEDURE — 93306 TTE W/DOPPLER COMPLETE: CPT | Performed by: INTERNAL MEDICINE

## 2020-05-29 PROCEDURE — 93306 TTE W/DOPPLER COMPLETE: CPT

## 2020-06-10 ENCOUNTER — OFFICE VISIT (OUTPATIENT)
Dept: FAMILY MEDICINE CLINIC | Facility: CLINIC | Age: 45
End: 2020-06-10

## 2020-06-10 VITALS
DIASTOLIC BLOOD PRESSURE: 56 MMHG | OXYGEN SATURATION: 99 % | HEART RATE: 79 BPM | BODY MASS INDEX: 20.32 KG/M2 | HEIGHT: 64 IN | SYSTOLIC BLOOD PRESSURE: 90 MMHG | WEIGHT: 119 LBS

## 2020-06-10 DIAGNOSIS — J32.9 CHRONIC CONGESTION OF PARANASAL SINUS: ICD-10-CM

## 2020-06-10 DIAGNOSIS — Z01.419 ENCNTR FOR GYN EXAM (GENERAL) (ROUTINE) W/O ABN FINDINGS: ICD-10-CM

## 2020-06-10 DIAGNOSIS — R06.9 BREATHING PROBLEM: Primary | ICD-10-CM

## 2020-06-10 DIAGNOSIS — F33.1 MODERATE EPISODE OF RECURRENT MAJOR DEPRESSIVE DISORDER (HCC): ICD-10-CM

## 2020-06-10 PROCEDURE — 99203 OFFICE O/P NEW LOW 30 MIN: CPT | Performed by: FAMILY MEDICINE

## 2020-06-10 RX ORDER — FLUTICASONE PROPIONATE 50 MCG
2 SPRAY, SUSPENSION (ML) NASAL 2 TIMES DAILY
Qty: 9.9 ML | Refills: 0 | Status: SHIPPED | OUTPATIENT
Start: 2020-06-10 | End: 2020-06-15

## 2020-06-10 RX ORDER — BUPROPION HYDROCHLORIDE 150 MG/1
150 TABLET ORAL DAILY
COMMUNITY

## 2020-06-10 NOTE — PROGRESS NOTES
Subjective   Opal Michaels is a 45 y.o. female.     Chief Complaint   Patient presents with   • Establish Care   • breathing issues     Trouble breathing through her nose, mostly notices at night.       History of Present Illness     Other physicians currently involved in patient's care:  Eleanor Villarreal- Cardiology  Psychiatrist- Dr. Rodriguez    Acute complaints:  Opal Michaels is a 45 y.o. female who presents today to establish care.  She was seen in the ER on 5/18/2020 for shortness of breath and chest pain.  The chest pain and shortness of breath or positional, she had a benign exam with normal vital signs and no cardiac risk factors at that point.  An EKG revealed normal sinus rhythm with a heart rate of 78, chest x-ray was negative.  She followed up in the chest pain clinic, was started on naproxen 500 mg twice daily for 2 weeks and also completed a Holter monitor and echo.  She has a follow-up visit scheduled for July 7.    Stopped smoking May 5th on Chantix.    She reports that she typically has trouble breathing through her nose, most noticeable at night.    This patient is accompanied by their self who contributes to the history of their care.    The following portions of the patient's history were reviewed and updated as appropriate: allergies, current medications, past family history, past medical history, past social history, past surgical history and problem list.    Active Ambulatory Problems     Diagnosis Date Noted   • Moderate episode of recurrent major depressive disorder (CMS/HCC) 06/10/2020   • Chronic congestion of paranasal sinus 06/10/2020     Resolved Ambulatory Problems     Diagnosis Date Noted   • No Resolved Ambulatory Problems     Past Medical History:   Diagnosis Date   • Depression    • Endometriosis    • Fibromyalgia, primary    • Gall stones    • History of stomach ulcers    • Kidney stone    • PCOS (polycystic ovarian syndrome)      Past Surgical History:   Procedure Laterality  "Date   • CHOLECYSTECTOMY     • HYSTERECTOMY     • TUBAL ABDOMINAL LIGATION       Family History   Problem Relation Age of Onset   • Hyperlipidemia Mother    • Hypertension Mother    • Hyperlipidemia Father    • Hypertension Father    • No Known Problems Sister    • No Known Problems Brother    • Diabetes Maternal Grandmother    • Diabetes Maternal Grandfather    • Stroke Paternal Grandmother    • Cancer Paternal Grandfather      Social History     Socioeconomic History   • Marital status:      Spouse name: Not on file   • Number of children: Not on file   • Years of education: Not on file   • Highest education level: Not on file   Tobacco Use   • Smoking status: Former Smoker     Types: Cigarettes     Last attempt to quit: 2020     Years since quittin.1   • Smokeless tobacco: Never Used   Substance and Sexual Activity   • Alcohol use: No   • Drug use: No   • Sexual activity: Defer   Social History Narrative    Caffeine: 3 sodas daily       Review of Systems  General ROS: negative for - chills, fever or night sweats  Cardiovascular ROS: no chest pain or dyspnea on exertion  Gastrointestinal ROS: no abdominal pain, change in bowel habits, or black or bloody stools  Genito-Urinary ROS: no dysuria, trouble voiding, or hematuria    Objective   Blood pressure 90/56, pulse 79, height 162.6 cm (64.02\"), weight 54 kg (119 lb), SpO2 99 %.  Nursing note reviewed  Physical Exam  Const: NAD, A&Ox4, Pleasant, Cooperative  Eyes: EOMI, no conjunctivitis  ENT: No nasal discharge present, neck supple  Cardiac: Regular rate and rhythm, no cyanosis  Resp: Respiratory rate within normal limits, no increased work of breathing, no audible wheezing or retractions noted  GI: No distention or ascites  MSK: Motor and sensation grossly intact in bilateral upper extremities  Neurologic: CN II-XII grossly intact  Psych: Appropriate mood and behavior.  Skin: Warm, dry  Procedures  Assessment/Plan   Problem List Items Addressed This " Visit        Respiratory    Chronic congestion of paranasal sinus    Relevant Orders    Ambulatory Referral to ENT (Otolaryngology)       Other    Moderate episode of recurrent major depressive disorder (CMS/HCC)    Relevant Medications    buPROPion XL (WELLBUTRIN XL) 150 MG 24 hr tablet      Other Visit Diagnoses     Breathing problem    -  Primary    Relevant Orders    Ambulatory Referral to ENT (Otolaryngology)    Encntr for gyn exam (general) (routine) w/o abn findings        Relevant Orders    Ambulatory Referral to Gynecology          We will plan to obtain previous records both for chronic preventative care as well as those related to the current episode of care.  Any records that the patient brought with her today were reviewed personally by me during the visit today and will be scanned into the chart for posterity.    Patient Instructions   1.  Call in 2 weeks with status      Return in about 3 months (around 9/10/2020) for Annual.    Ambulatory progress note signed and attested to by Don Daley D.O.

## 2020-08-09 PROBLEM — Z01.419 WELL WOMAN EXAM: Status: ACTIVE | Noted: 2020-08-09

## 2020-09-04 ENCOUNTER — TELEPHONE (OUTPATIENT)
Dept: OBSTETRICS AND GYNECOLOGY | Facility: CLINIC | Age: 45
End: 2020-09-04

## 2020-10-19 ENCOUNTER — TELEPHONE (OUTPATIENT)
Dept: FAMILY MEDICINE CLINIC | Facility: CLINIC | Age: 45
End: 2020-10-19

## 2020-10-19 DIAGNOSIS — J32.9 CHRONIC CONGESTION OF PARANASAL SINUS: Primary | ICD-10-CM

## 2020-10-21 ENCOUNTER — TELEPHONE (OUTPATIENT)
Dept: FAMILY MEDICINE CLINIC | Facility: CLINIC | Age: 45
End: 2020-10-21

## 2020-10-21 RX ORDER — FLUTICASONE PROPIONATE 50 MCG
1 SPRAY, SUSPENSION (ML) NASAL DAILY
Qty: 9.9 ML | Refills: 11 | Status: SHIPPED | OUTPATIENT
Start: 2020-10-21

## 2021-02-10 ENCOUNTER — TELEPHONE (OUTPATIENT)
Dept: FAMILY MEDICINE CLINIC | Facility: CLINIC | Age: 46
End: 2021-02-10

## 2021-02-10 DIAGNOSIS — Z00.00 PREVENTATIVE HEALTH CARE: Primary | ICD-10-CM

## 2021-02-10 NOTE — TELEPHONE ENCOUNTER
PT IS PLANNING TO SCHEDULE HER YEARLY APPT BUT WANTS TO GET LAB WORK DONE BEFORE SHE SCHEDULES APPT.  PT REQUESTING ORDERS BE PLACED FOR LAB WORK AND A CALL BACK TO LET HER KNOW SHE CAN COME AND GET THEM DONE.

## 2021-02-12 NOTE — TELEPHONE ENCOUNTER
CC:  Columba DAI Carrasco is here today for R shoulder pain-chronic.   Medications: medications verified, no change  Refills needed today?  NO  Denies known Latex allergy or symptoms of Latex sensitivity.  Patient would like communication of their results via:    Home Phone: [phone] (home).  Okay to leave a message containing results? Yes  Tobacco history: verified    There are no preventive care reminders to display for this patient.  Patient is up to date, no discussion needed.    [facility] status addressed. Patient Active.            Called pt, received no answer. Left vm with office number given.    HUB MAY RELAY MESSAGE     Lab orders placed

## 2025-03-20 ENCOUNTER — OFFICE VISIT (OUTPATIENT)
Dept: FAMILY MEDICINE CLINIC | Facility: CLINIC | Age: 50
End: 2025-03-20
Payer: MEDICAID

## 2025-03-20 ENCOUNTER — LAB (OUTPATIENT)
Dept: LAB | Facility: HOSPITAL | Age: 50
End: 2025-03-20
Payer: MEDICAID

## 2025-03-20 VITALS
DIASTOLIC BLOOD PRESSURE: 84 MMHG | WEIGHT: 162.4 LBS | OXYGEN SATURATION: 98 % | HEART RATE: 86 BPM | HEIGHT: 64 IN | BODY MASS INDEX: 27.72 KG/M2 | SYSTOLIC BLOOD PRESSURE: 126 MMHG

## 2025-03-20 DIAGNOSIS — Z13.89 SCREENING FOR BLOOD OR PROTEIN IN URINE: ICD-10-CM

## 2025-03-20 DIAGNOSIS — N95.1 MENOPAUSAL SYMPTOMS: ICD-10-CM

## 2025-03-20 DIAGNOSIS — R63.1 INCREASED THIRST: Primary | ICD-10-CM

## 2025-03-20 DIAGNOSIS — IMO0001 SMOKING: ICD-10-CM

## 2025-03-20 DIAGNOSIS — Z13.29 SCREENING FOR ENDOCRINE DISORDER: ICD-10-CM

## 2025-03-20 DIAGNOSIS — Z00.00 PREVENTATIVE HEALTH CARE: ICD-10-CM

## 2025-03-20 DIAGNOSIS — Z11.59 ENCOUNTER FOR HEPATITIS C SCREENING TEST FOR LOW RISK PATIENT: ICD-10-CM

## 2025-03-20 DIAGNOSIS — Z13.220 SCREENING FOR HYPERLIPIDEMIA: ICD-10-CM

## 2025-03-20 DIAGNOSIS — R53.83 LETHARGY: ICD-10-CM

## 2025-03-20 DIAGNOSIS — Z13.0 SCREENING FOR DEFICIENCY ANEMIA: ICD-10-CM

## 2025-03-20 DIAGNOSIS — Z13.6 SCREENING FOR HYPERTENSION: ICD-10-CM

## 2025-03-20 DIAGNOSIS — E55.9 VITAMIN D DEFICIENCY: ICD-10-CM

## 2025-03-20 DIAGNOSIS — E56.9 VITAMIN DEFICIENCY: ICD-10-CM

## 2025-03-20 DIAGNOSIS — Z13.6 SCREENING FOR CARDIOVASCULAR CONDITION: ICD-10-CM

## 2025-03-20 DIAGNOSIS — R35.0 FREQUENT URINATION: ICD-10-CM

## 2025-03-20 PROBLEM — F17.200 SMOKING: Status: ACTIVE | Noted: 2025-03-20

## 2025-03-20 RX ORDER — ESTRADIOL 0.05 MG/D
PATCH, EXTENDED RELEASE TRANSDERMAL
COMMUNITY
Start: 2025-02-28

## 2025-03-20 RX ORDER — AMOXICILLIN 875 MG/1
TABLET, COATED ORAL
COMMUNITY
Start: 2025-03-19

## 2025-03-20 RX ORDER — PROGESTERONE 100 MG/1
1 CAPSULE ORAL DAILY
COMMUNITY
Start: 2025-02-28

## 2025-03-20 NOTE — PROGRESS NOTES
New Patient Office Visit      Patient Name: Opal Michaels  : 1975   MRN: [id number]     Chief Complaint:    Chief Complaint   Patient presents with    Establish Care       Subjective   History of Present Illness    History of Present Illness: Opal Michaels is a 50 y.o. female who is here today to establish care.    Previous primary care: She was seen for 1 visit to establish care in 2020 after ER visits for chest pain.  She did not return for follow-up and has not been seen since that time.  -Has been seeing Tara Vo in [place] for primary care since then. Has been getting pap smears and GYN care with Teresita Tian    Chronic health conditions:  S/P hysterectomy with unilateral oophorectomy at that time (endometriosis): Has not had menstrual period since that time.    Other physicians currently involved in patient's care:  Patient Care Team:  Don Daley DO as PCP - General (Family Medicine)  She had previously seen Regina Villarreal with cardiology and was seeing a psychiatrist Dr. Rodriguez.  It looks like she had a couple appointments scheduled with gynecologist Dr. De La Cruz but did not show to these.  On review of records it looks like she was seen at [facility] by Dr. Schulz for new chest pain in 2023.    Acute Concerns:  Concerned about menopause and weight gain. Symptoms started last year, started HRT which helped with night sweats and hot flashes but weight gain has continued along with very low energy, low libido. Last blood work 2024.  -Was at ~120lbs as of  into , initially estimated she has gained about 70-80lbs over that time, but it looks like it has been about 40lbs total. Does not keep a food journal, usually cooks 1 homemade meal per day no snacking. Exercise is usually walking.    This patient is accompanied by their self who contributes to the history of their care.    The following portions of the patient's history were reviewed and  updated as appropriate: allergies, current medications, past family history, past medical history, past social history, past surgical history and problem list.    Subjective      Review of Systems:   Review of Systems - See HPI and new patient paperwork scanned into chart    Past Medical History:   Past Medical History:   Diagnosis Date    Depression     Endometriosis     Fibromyalgia, primary     Gall stones     History of stomach ulcers     Kidney stone     PCOS (polycystic ovarian syndrome)        Past Surgical History:   Past Surgical History:   Procedure Laterality Date    CHOLECYSTECTOMY      HYSTERECTOMY      TUBAL ABDOMINAL LIGATION         Family History:   Family History   Problem Relation Age of Onset    Hyperlipidemia Mother     Hypertension Mother     Hepatitis Mother         FAITH    Hyperlipidemia Father     Hypertension Father     No Known Problems Sister     No Known Problems Brother     Diabetes Maternal Grandmother     Diabetes Maternal Grandfather     Stroke Paternal Grandmother     Cancer Paternal Grandfather        Social History:   Social History     Socioeconomic History    Marital status:    Tobacco Use    Smoking status: Former     Current packs/day: 0.00     Average packs/day: 1 pack/day for 25.3 years (25.3 ttl pk-yrs)     Types: Cigarettes     Start date: 1995     Quit date: 2020     Years since quittin.8    Smokeless tobacco: Never   Vaping Use    Vaping status: Never Used   Substance and Sexual Activity    Alcohol use: No    Drug use: No    Sexual activity: Defer       Social History     Social History Narrative    Caffeine: 3 sodas daily        Tobacco History:   Social History     Tobacco Use   Smoking Status Former    Current packs/day: 0.00    Average packs/day: 1 pack/day for 25.3 years (25.3 ttl pk-yrs)    Types: Cigarettes    Start date: 1995    Quit date: 2020    Years since quittin.8   Smokeless Tobacco Never       Medications:   Outpatient  "Medications Prior to Visit   Medication Sig Dispense Refill    amoxicillin (AMOXIL) 875 MG tablet  (Patient not taking: Reported on 3/27/2025)      Kristi 0.05 MG/24HR patch APPLY 1 PATCH TOPICALLY TWICE A WEEK      ibuprofen (ADVIL,MOTRIN) 600 MG tablet Take 1 tablet by mouth Every 6 (Six) Hours As Needed for Mild Pain.      Progesterone (PROMETRIUM) 100 MG capsule Take 1 capsule by mouth Daily.      ARIPiprazole (ABILIFY PO) Take 4 mg by mouth Daily. (Patient not taking: Reported on 3/20/2025)      Buprenorphine HCl-Naloxone HCl (SUBOXONE SL) Place 8 mg under the tongue Daily. (Patient not taking: Reported on 3/20/2025)      buPROPion XL (WELLBUTRIN XL) 150 MG 24 hr tablet Take 150 mg by mouth Daily. (Patient not taking: Reported on 3/20/2025)      fluticasone (Flonase) 50 MCG/ACT nasal spray 1 spray into the nostril(s) as directed by provider Daily. (Patient not taking: Reported on 3/20/2025) 9.9 mL 11    Varenicline Tartrate (CHANTIX PO) Take  by mouth. (Patient not taking: Reported on 3/20/2025)       No facility-administered medications prior to visit.        Allergies:   No Known Allergies    Objective   Objective     Physical Exam:  Vital Signs:   Vitals:    03/20/25 1302   BP: 126/84   Pulse: 86   SpO2: 98%   Weight: 73.7 kg (162 lb 6.4 oz)   Height: 162.6 cm (64.02\")     Body mass index is 27.86 kg/m².     Physical Exam  Nursing note reviewed  Const: NAD, A&Ox4, Pleasant, Cooperative  Eyes: EOMI, no conjunctivitis  ENT: No nasal discharge present, neck supple  Cardiac: Regular rate and rhythm, no cyanosis  Resp: Respiratory rate within normal limits, no increased work of breathing, no audible wheezing or retractions noted  GI: No distention or ascites  MSK: Motor and sensation grossly intact in bilateral upper extremities  Neurologic: CN II-XII grossly intact  Psych: Appropriate mood and behavior.  Skin: Warm, dry  Procedures/Radiology     Procedures  No radiology results for the last 7 days     Assessment " & Plan   Assessment / Plan      Assessment/Plan:   Problems Addressed This Visit  Diagnoses and all orders for this visit:    1. Increased thirst (Primary)  -     Lipid Panel; Future  -     High Sensitivity CRP; Future  -     Hemoglobin A1c; Future  -     Comprehensive Metabolic Panel; Future  -     CBC & Differential; Future  -     Urinalysis With Microscopic If Indicated (No Culture) - Urine, Clean Catch; Future  -     TSH Rfx On Abnormal To Free T4; Future  -     Vitamin D,25-Hydroxy; Future  -     Vitamin B12; Future  -     Hepatitis C Antibody; Future  -     Testosterone; Future  -     Progesterone; Future  -     Estrogens, Fractionated; Future  -     Insulin, Total; Future  -     Uric Acid; Future  -     Phosphorus; Future  -     Magnesium; Future  -     Vitamin B6; Future    2. Frequent urination  -     Lipid Panel; Future  -     High Sensitivity CRP; Future  -     Hemoglobin A1c; Future  -     Comprehensive Metabolic Panel; Future  -     CBC & Differential; Future  -     Urinalysis With Microscopic If Indicated (No Culture) - Urine, Clean Catch; Future  -     TSH Rfx On Abnormal To Free T4; Future  -     Vitamin D,25-Hydroxy; Future  -     Vitamin B12; Future  -     Hepatitis C Antibody; Future  -     Testosterone; Future  -     Progesterone; Future  -     Estrogens, Fractionated; Future  -     Insulin, Total; Future  -     Uric Acid; Future  -     Phosphorus; Future  -     Magnesium; Future  -     Vitamin B6; Future    3. Lethargy  -     Lipid Panel; Future  -     High Sensitivity CRP; Future  -     Hemoglobin A1c; Future  -     Comprehensive Metabolic Panel; Future  -     CBC & Differential; Future  -     Urinalysis With Microscopic If Indicated (No Culture) - Urine, Clean Catch; Future  -     TSH Rfx On Abnormal To Free T4; Future  -     Vitamin D,25-Hydroxy; Future  -     Vitamin B12; Future  -     Hepatitis C Antibody; Future  -     Testosterone; Future  -     Progesterone; Future  -     Estrogens,  Fractionated; Future  -     Insulin, Total; Future  -     Uric Acid; Future  -     Phosphorus; Future  -     Magnesium; Future  -     Vitamin B6; Future    4. Smoking  Assessment & Plan:  ~1ppd since 1997      5. Menopausal symptoms    6. Screening for hyperlipidemia  -     Lipid Panel; Future    7. Preventative health care  -     Lipid Panel; Future  -     High Sensitivity CRP; Future  -     Hemoglobin A1c; Future  -     Comprehensive Metabolic Panel; Future  -     CBC & Differential; Future  -     Urinalysis With Microscopic If Indicated (No Culture) - Urine, Clean Catch; Future  -     TSH Rfx On Abnormal To Free T4; Future  -     Vitamin D,25-Hydroxy; Future  -     Vitamin B12; Future  -     Hepatitis C Antibody; Future  -     Testosterone; Future  -     Progesterone; Future  -     Estrogens, Fractionated; Future  -     Insulin, Total; Future  -     Uric Acid; Future    8. Screening for cardiovascular condition  -     High Sensitivity CRP; Future    9. Screening for endocrine disorder  -     Hemoglobin A1c; Future  -     Comprehensive Metabolic Panel; Future  -     TSH Rfx On Abnormal To Free T4; Future  -     Testosterone; Future  -     Progesterone; Future  -     Estrogens, Fractionated; Future  -     Insulin, Total; Future    10. Screening for deficiency anemia  -     CBC & Differential; Future    11. Screening for blood or protein in urine  -     Urinalysis With Microscopic If Indicated (No Culture) - Urine, Clean Catch; Future    12. Vitamin D deficiency  -     Vitamin D,25-Hydroxy; Future    13. Vitamin deficiency  -     Vitamin B12; Future    14. Encounter for hepatitis C screening test for low risk patient  -     Hepatitis C Antibody; Future    15. Screening for hypertension  -     Uric Acid; Future      Problem List Items Addressed This Visit          Tobacco    Smoking    Current Assessment & Plan   ~1ppd since 1997          Other Visit Diagnoses         Increased thirst    -  Primary    Relevant Orders     Lipid Panel (Completed)    High Sensitivity CRP (Completed)    Hemoglobin A1c (Completed)    Comprehensive Metabolic Panel (Completed)    CBC & Differential (Completed)    Urinalysis With Microscopic If Indicated (No Culture) - Urine, Clean Catch (Completed)    TSH Rfx On Abnormal To Free T4 (Completed)    Vitamin D,25-Hydroxy (Completed)    Vitamin B12 (Completed)    Hepatitis C Antibody (Completed)    Testosterone (Completed)    Progesterone (Completed)    Estrogens, Fractionated (Completed)    Insulin, Total (Completed)    Uric Acid (Completed)    Phosphorus (Completed)    Magnesium (Completed)    Vitamin B6 (Completed)      Frequent urination        Relevant Orders    Lipid Panel (Completed)    High Sensitivity CRP (Completed)    Hemoglobin A1c (Completed)    Comprehensive Metabolic Panel (Completed)    CBC & Differential (Completed)    Urinalysis With Microscopic If Indicated (No Culture) - Urine, Clean Catch (Completed)    TSH Rfx On Abnormal To Free T4 (Completed)    Vitamin D,25-Hydroxy (Completed)    Vitamin B12 (Completed)    Hepatitis C Antibody (Completed)    Testosterone (Completed)    Progesterone (Completed)    Estrogens, Fractionated (Completed)    Insulin, Total (Completed)    Uric Acid (Completed)    Phosphorus (Completed)    Magnesium (Completed)    Vitamin B6 (Completed)      Lethargy        Relevant Orders    Lipid Panel (Completed)    High Sensitivity CRP (Completed)    Hemoglobin A1c (Completed)    Comprehensive Metabolic Panel (Completed)    CBC & Differential (Completed)    Urinalysis With Microscopic If Indicated (No Culture) - Urine, Clean Catch (Completed)    TSH Rfx On Abnormal To Free T4 (Completed)    Vitamin D,25-Hydroxy (Completed)    Vitamin B12 (Completed)    Hepatitis C Antibody (Completed)    Testosterone (Completed)    Progesterone (Completed)    Estrogens, Fractionated (Completed)    Insulin, Total (Completed)    Uric Acid (Completed)    Phosphorus (Completed)    Magnesium  (Completed)    Vitamin B6 (Completed)      Menopausal symptoms          Screening for hyperlipidemia        Relevant Orders    Lipid Panel (Completed)      Preventative health care        Relevant Orders    Lipid Panel (Completed)    High Sensitivity CRP (Completed)    Hemoglobin A1c (Completed)    Comprehensive Metabolic Panel (Completed)    CBC & Differential (Completed)    Urinalysis With Microscopic If Indicated (No Culture) - Urine, Clean Catch (Completed)    TSH Rfx On Abnormal To Free T4 (Completed)    Vitamin D,25-Hydroxy (Completed)    Vitamin B12 (Completed)    Hepatitis C Antibody (Completed)    Testosterone (Completed)    Progesterone (Completed)    Estrogens, Fractionated (Completed)    Insulin, Total (Completed)    Uric Acid (Completed)      Screening for cardiovascular condition        Relevant Orders    High Sensitivity CRP (Completed)      Screening for endocrine disorder        Relevant Orders    Hemoglobin A1c (Completed)    Comprehensive Metabolic Panel (Completed)    TSH Rfx On Abnormal To Free T4 (Completed)    Testosterone (Completed)    Progesterone (Completed)    Estrogens, Fractionated (Completed)    Insulin, Total (Completed)      Screening for deficiency anemia        Relevant Orders    CBC & Differential (Completed)      Screening for blood or protein in urine        Relevant Orders    Urinalysis With Microscopic If Indicated (No Culture) - Urine, Clean Catch (Completed)      Vitamin D deficiency        Relevant Orders    Vitamin D,25-Hydroxy (Completed)      Vitamin deficiency        Relevant Orders    Vitamin B12 (Completed)      Encounter for hepatitis C screening test for low risk patient        Relevant Orders    Hepatitis C Antibody (Completed)      Screening for hypertension        Relevant Orders    Uric Acid (Completed)            We will plan to obtain previous records both for chronic preventative care as well as those related to the current episode of care.  Any records that  the patient brought with her today were reviewed personally by me during the visit today and will be scanned into the chart for posterity.    Discussed the nature of the disease including relevant anatomy & expected clinical course, risks, complications, implications, management, safe and proper use of medications. Plan of care reviewed with patient at the conclusion of today's visit. Education was provided regarding diagnosis and management.  Patient verbalizes understanding of and agreement with management plan. Encouraged therapeutic lifestyle changes including low calorie diet with plenty of fruits and vegetables, daily exercise, medication compliance, and keeping scheduled follow up appointments with me and any other providers. Encouraged patient to have appointment for complete physical, fasting labs, appropriate screenings, and immunizations on an annual basis. Discussed extended office hours, shared call, and appropriate use of the ER. Discussed generally we do not prescribe chronic controlled substances from this office. Appropriate referrals will be made to pain management and psychiatry if needed. Stressed the importance and expectation of medical compliance with plan of care, medications, and follow up appointments.    There are no Patient Instructions on file for this visit.    Follow Up:   Return in about 1 week (around 3/27/2025) for Annual.    MDM     I spent 54 minutes caring for Opal on this date of service. This time includes time spent by me in the following activities:preparing for the visit, performing a medically appropriate examination and/or evaluation , counseling and educating the patient/family/caregiver, ordering medications, tests, or procedures, and documenting information in the medical record    DO CLAUDIO Smith RD  [facility]  [address]  [facility] [address]  Fax [phone]  Phone [phone]    Disclaimer  to patients: The 21st Century Cares Act makes medical notes like these available to patients in the interest of transparency. However, please be advised that this is still a medical document. It is intended as [id number] communication. Many sections may include medical language or jargon, abbreviations, and additional verbiage that are unfamiliar or confusing. In some ways it may come across as blunt, direct, or may be summarized in order to clearly and concisely communicate the most crucial information to medical professionals. It may also include mentions of conditions that are unlikely but considered as part of the differential diagnosis, including serious disorders. These are not always discussed at length at the time of appointment because their likelihood is so low, but may be included in a medical note to make it clear what has been considered and/or ruled out as part of a work-up. Medical documents are intended to carry relevant information, facts as evident, and the personal clinical opinion of the physician. If you have any questions regarding this medical document, please bring them to the attention of the physician at your next scheduled appointment.

## 2025-03-21 ENCOUNTER — LAB (OUTPATIENT)
Dept: LAB | Facility: HOSPITAL | Age: 50
End: 2025-03-21
Payer: MEDICAID

## 2025-03-21 DIAGNOSIS — E56.9 VITAMIN DEFICIENCY: ICD-10-CM

## 2025-03-21 DIAGNOSIS — Z13.89 SCREENING FOR BLOOD OR PROTEIN IN URINE: ICD-10-CM

## 2025-03-21 DIAGNOSIS — Z13.29 SCREENING FOR ENDOCRINE DISORDER: ICD-10-CM

## 2025-03-21 DIAGNOSIS — Z13.0 SCREENING FOR DEFICIENCY ANEMIA: ICD-10-CM

## 2025-03-21 DIAGNOSIS — Z00.00 PREVENTATIVE HEALTH CARE: ICD-10-CM

## 2025-03-21 DIAGNOSIS — Z11.59 ENCOUNTER FOR HEPATITIS C SCREENING TEST FOR LOW RISK PATIENT: ICD-10-CM

## 2025-03-21 DIAGNOSIS — Z13.220 SCREENING FOR HYPERLIPIDEMIA: ICD-10-CM

## 2025-03-21 DIAGNOSIS — R63.1 INCREASED THIRST: ICD-10-CM

## 2025-03-21 DIAGNOSIS — R53.83 LETHARGY: ICD-10-CM

## 2025-03-21 DIAGNOSIS — Z13.6 SCREENING FOR HYPERTENSION: ICD-10-CM

## 2025-03-21 DIAGNOSIS — E55.9 VITAMIN D DEFICIENCY: ICD-10-CM

## 2025-03-21 DIAGNOSIS — R35.0 FREQUENT URINATION: ICD-10-CM

## 2025-03-21 DIAGNOSIS — Z13.6 SCREENING FOR CARDIOVASCULAR CONDITION: ICD-10-CM

## 2025-03-21 LAB
25(OH)D3 SERPL-MCNC: 30.9 NG/ML (ref 30–100)
ALBUMIN SERPL-MCNC: 5.9 G/DL (ref 3.5–5.2)
ALBUMIN/GLOB SERPL: 5.4 G/DL
ALP SERPL-CCNC: 95 U/L (ref 39–117)
ALT SERPL W P-5'-P-CCNC: 9 U/L (ref 1–33)
ANION GAP SERPL CALCULATED.3IONS-SCNC: 16.5 MMOL/L (ref 5–15)
AST SERPL-CCNC: 16 U/L (ref 1–32)
BACTERIA UR QL AUTO: ABNORMAL /HPF
BASOPHILS # BLD AUTO: 0.07 10*3/MM3 (ref 0–0.2)
BASOPHILS NFR BLD AUTO: 0.7 % (ref 0–1.5)
BILIRUB SERPL-MCNC: <0.2 MG/DL (ref 0–1.2)
BILIRUB UR QL STRIP: NEGATIVE
BUN SERPL-MCNC: 15 MG/DL (ref 6–20)
BUN/CREAT SERPL: 14.6 (ref 7–25)
CALCIUM SPEC-SCNC: 9.3 MG/DL (ref 8.6–10.5)
CHLORIDE SERPL-SCNC: 101 MMOL/L (ref 98–107)
CHOLEST SERPL-MCNC: 185 MG/DL (ref 0–200)
CLARITY UR: ABNORMAL
CO2 SERPL-SCNC: 24.5 MMOL/L (ref 22–29)
COLOR UR: YELLOW
CREAT SERPL-MCNC: 1.03 MG/DL (ref 0.57–1)
CRP SERPL-MCNC: 0.82 MG/DL (ref 0.01–0.5)
DEPRECATED RDW RBC AUTO: 39.2 FL (ref 37–54)
EGFRCR SERPLBLD CKD-EPI 2021: 67.2 ML/MIN/1.73
EOSINOPHIL # BLD AUTO: 0.24 10*3/MM3 (ref 0–0.4)
EOSINOPHIL NFR BLD AUTO: 2.2 % (ref 0.3–6.2)
ERYTHROCYTE [DISTWIDTH] IN BLOOD BY AUTOMATED COUNT: 12.4 % (ref 12.3–15.4)
GLOBULIN UR ELPH-MCNC: 1.1 GM/DL
GLUCOSE SERPL-MCNC: 82 MG/DL (ref 65–99)
GLUCOSE UR STRIP-MCNC: NEGATIVE MG/DL
HBA1C MFR BLD: 5.2 % (ref 4.8–5.6)
HCT VFR BLD AUTO: 38.5 % (ref 34–46.6)
HCV AB SER QL: NORMAL
HDLC SERPL-MCNC: 47 MG/DL (ref 40–60)
HGB BLD-MCNC: 13 G/DL (ref 12–15.9)
HGB UR QL STRIP.AUTO: NEGATIVE
HYALINE CASTS UR QL AUTO: ABNORMAL /LPF
IMM GRANULOCYTES # BLD AUTO: 0.03 10*3/MM3 (ref 0–0.05)
IMM GRANULOCYTES NFR BLD AUTO: 0.3 % (ref 0–0.5)
KETONES UR QL STRIP: ABNORMAL
LDLC SERPL CALC-MCNC: 113 MG/DL (ref 0–100)
LDLC/HDLC SERPL: 2.33 {RATIO}
LEUKOCYTE ESTERASE UR QL STRIP.AUTO: ABNORMAL
LYMPHOCYTES # BLD AUTO: 3.37 10*3/MM3 (ref 0.7–3.1)
LYMPHOCYTES NFR BLD AUTO: 31.3 % (ref 19.6–45.3)
MAGNESIUM SERPL-MCNC: 1.7 MG/DL (ref 1.6–2.6)
MCH RBC QN AUTO: 29.3 PG (ref 26.6–33)
MCHC RBC AUTO-ENTMCNC: 33.8 G/DL (ref 31.5–35.7)
MCV RBC AUTO: 86.9 FL (ref 79–97)
MONOCYTES # BLD AUTO: 0.69 10*3/MM3 (ref 0.1–0.9)
MONOCYTES NFR BLD AUTO: 6.4 % (ref 5–12)
NEUTROPHILS NFR BLD AUTO: 59.1 % (ref 42.7–76)
NEUTROPHILS NFR BLD AUTO: 6.36 10*3/MM3 (ref 1.7–7)
NITRITE UR QL STRIP: POSITIVE
NRBC BLD AUTO-RTO: 0 /100 WBC (ref 0–0.2)
PH UR STRIP.AUTO: 6 [PH] (ref 5–8)
PHOSPHATE SERPL-MCNC: 2.4 MG/DL (ref 2.5–4.5)
PLATELET # BLD AUTO: 313 10*3/MM3 (ref 140–450)
PMV BLD AUTO: 11.6 FL (ref 6–12)
POTASSIUM SERPL-SCNC: 3.7 MMOL/L (ref 3.5–5.2)
PROGEST SERPL-MCNC: 4.79 NG/ML
PROT SERPL-MCNC: 7 G/DL (ref 6–8.5)
PROT UR QL STRIP: ABNORMAL
RBC # BLD AUTO: 4.43 10*6/MM3 (ref 3.77–5.28)
RBC # UR STRIP: ABNORMAL /HPF
REF LAB TEST METHOD: ABNORMAL
SODIUM SERPL-SCNC: 142 MMOL/L (ref 136–145)
SP GR UR STRIP: 1.02 (ref 1–1.03)
SQUAMOUS #/AREA URNS HPF: ABNORMAL /HPF
TESTOST SERPL-MCNC: 67.6 NG/DL (ref 8.4–48.1)
TRIGL SERPL-MCNC: 142 MG/DL (ref 0–150)
TSH SERPL DL<=0.05 MIU/L-ACNC: 2.51 UIU/ML (ref 0.27–4.2)
URATE SERPL-MCNC: 6 MG/DL (ref 2.4–5.7)
UROBILINOGEN UR QL STRIP: ABNORMAL
VIT B12 BLD-MCNC: >2000 PG/ML (ref 211–946)
VLDLC SERPL-MCNC: 25 MG/DL (ref 5–40)
WBC # UR STRIP: ABNORMAL /HPF
WBC NRBC COR # BLD AUTO: 10.76 10*3/MM3 (ref 3.4–10.8)

## 2025-03-21 PROCEDURE — 80061 LIPID PANEL: CPT

## 2025-03-21 PROCEDURE — 82306 VITAMIN D 25 HYDROXY: CPT

## 2025-03-21 PROCEDURE — 84144 ASSAY OF PROGESTERONE: CPT

## 2025-03-21 PROCEDURE — 85025 COMPLETE CBC W/AUTO DIFF WBC: CPT

## 2025-03-21 PROCEDURE — 84550 ASSAY OF BLOOD/URIC ACID: CPT

## 2025-03-21 PROCEDURE — 82607 VITAMIN B-12: CPT

## 2025-03-21 PROCEDURE — 84207 ASSAY OF VITAMIN B-6: CPT

## 2025-03-21 PROCEDURE — 83735 ASSAY OF MAGNESIUM: CPT

## 2025-03-21 PROCEDURE — 82679 ASSAY OF ESTRONE: CPT

## 2025-03-21 PROCEDURE — 84443 ASSAY THYROID STIM HORMONE: CPT

## 2025-03-21 PROCEDURE — 83525 ASSAY OF INSULIN: CPT

## 2025-03-21 PROCEDURE — 80053 COMPREHEN METABOLIC PANEL: CPT

## 2025-03-21 PROCEDURE — 83036 HEMOGLOBIN GLYCOSYLATED A1C: CPT

## 2025-03-21 PROCEDURE — 84100 ASSAY OF PHOSPHORUS: CPT

## 2025-03-21 PROCEDURE — 86141 C-REACTIVE PROTEIN HS: CPT

## 2025-03-21 PROCEDURE — 82670 ASSAY OF TOTAL ESTRADIOL: CPT

## 2025-03-21 PROCEDURE — 81001 URINALYSIS AUTO W/SCOPE: CPT

## 2025-03-21 PROCEDURE — 84403 ASSAY OF TOTAL TESTOSTERONE: CPT

## 2025-03-21 PROCEDURE — 86803 HEPATITIS C AB TEST: CPT

## 2025-03-23 LAB — INSULIN SERPL-ACNC: 8.8 UIU/ML (ref 2.6–24.9)

## 2025-03-25 LAB
ESTRADIOL SERPL-MCNC: 109 PG/ML
ESTRONE SERPL-MCNC: 92 PG/ML

## 2025-03-26 LAB — PYRIDOXAL PHOS SERPL-MCNC: 4.8 UG/L (ref 3.4–65.2)

## 2025-03-27 ENCOUNTER — OFFICE VISIT (OUTPATIENT)
Dept: FAMILY MEDICINE CLINIC | Facility: CLINIC | Age: 50
End: 2025-03-27
Payer: MEDICAID

## 2025-03-27 ENCOUNTER — LAB (OUTPATIENT)
Dept: LAB | Facility: HOSPITAL | Age: 50
End: 2025-03-27
Payer: MEDICAID

## 2025-03-27 ENCOUNTER — TELEPHONE (OUTPATIENT)
Dept: FAMILY MEDICINE CLINIC | Facility: CLINIC | Age: 50
End: 2025-03-27

## 2025-03-27 ENCOUNTER — PATIENT ROUNDING (BHMG ONLY) (OUTPATIENT)
Dept: FAMILY MEDICINE CLINIC | Facility: CLINIC | Age: 50
End: 2025-03-27
Payer: MEDICAID

## 2025-03-27 VITALS
HEART RATE: 93 BPM | BODY MASS INDEX: 27.52 KG/M2 | SYSTOLIC BLOOD PRESSURE: 114 MMHG | HEIGHT: 64 IN | OXYGEN SATURATION: 99 % | DIASTOLIC BLOOD PRESSURE: 80 MMHG | WEIGHT: 161.2 LBS

## 2025-03-27 DIAGNOSIS — R05.3 PERSISTENT COUGH FOR 3 WEEKS OR LONGER: ICD-10-CM

## 2025-03-27 DIAGNOSIS — R79.82 ELEVATED C-REACTIVE PROTEIN (CRP): ICD-10-CM

## 2025-03-27 DIAGNOSIS — N95.1 PERIMENOPAUSAL SYMPTOM: Primary | ICD-10-CM

## 2025-03-27 DIAGNOSIS — R63.1 INCREASED THIRST: ICD-10-CM

## 2025-03-27 DIAGNOSIS — K21.9 GASTROESOPHAGEAL REFLUX DISEASE WITHOUT ESOPHAGITIS: ICD-10-CM

## 2025-03-27 DIAGNOSIS — R79.89 HIGH SERUM VITAMIN B12: ICD-10-CM

## 2025-03-27 DIAGNOSIS — R61 NIGHT SWEATS: ICD-10-CM

## 2025-03-27 DIAGNOSIS — R53.83 LETHARGY: ICD-10-CM

## 2025-03-27 DIAGNOSIS — R14.0 ABDOMINAL BLOATING: ICD-10-CM

## 2025-03-27 DIAGNOSIS — N95.1 PERIMENOPAUSAL SYMPTOM: ICD-10-CM

## 2025-03-27 DIAGNOSIS — IMO0001 SMOKING: ICD-10-CM

## 2025-03-27 LAB
ALBUMIN SERPL-MCNC: 5 G/DL (ref 3.5–5.2)
ALBUMIN/GLOB SERPL: 1.5 G/DL
ALP SERPL-CCNC: 116 U/L (ref 39–117)
ALT SERPL W P-5'-P-CCNC: 11 U/L (ref 1–33)
ANION GAP SERPL CALCULATED.3IONS-SCNC: 17.6 MMOL/L (ref 5–15)
AST SERPL-CCNC: 17 U/L (ref 1–32)
BASOPHILS # BLD AUTO: 0.1 10*3/MM3 (ref 0–0.2)
BASOPHILS NFR BLD AUTO: 0.8 % (ref 0–1.5)
BILIRUB SERPL-MCNC: 0.3 MG/DL (ref 0–1.2)
BUN SERPL-MCNC: 13 MG/DL (ref 6–20)
BUN/CREAT SERPL: 12.6 (ref 7–25)
C3 SERPL-MCNC: 178 MG/DL (ref 82–167)
C4 SERPL-MCNC: 23 MG/DL (ref 14–44)
CALCIUM SPEC-SCNC: 10.1 MG/DL (ref 8.6–10.5)
CHLORIDE SERPL-SCNC: 100 MMOL/L (ref 98–107)
CHROMATIN AB SERPL-ACNC: <10 IU/ML (ref 0–14)
CO2 SERPL-SCNC: 24.4 MMOL/L (ref 22–29)
CORTIS SERPL-MCNC: 16.2 MCG/DL
CREAT SERPL-MCNC: 1.03 MG/DL (ref 0.57–1)
CRP SERPL-MCNC: 0.97 MG/DL (ref 0–0.5)
DEPRECATED RDW RBC AUTO: 39.1 FL (ref 37–54)
EGFRCR SERPLBLD CKD-EPI 2021: 67.2 ML/MIN/1.73
EOSINOPHIL # BLD AUTO: 0.27 10*3/MM3 (ref 0–0.4)
EOSINOPHIL NFR BLD AUTO: 2.3 % (ref 0.3–6.2)
ERYTHROCYTE [DISTWIDTH] IN BLOOD BY AUTOMATED COUNT: 12.7 % (ref 12.3–15.4)
ERYTHROCYTE [SEDIMENTATION RATE] IN BLOOD: 27 MM/HR (ref 0–20)
FSH SERPL-ACNC: 30.5 MIU/ML
GLOBULIN UR ELPH-MCNC: 3.3 GM/DL
GLUCOSE SERPL-MCNC: 83 MG/DL (ref 65–99)
HCT VFR BLD AUTO: 43.7 % (ref 34–46.6)
HGB BLD-MCNC: 15.2 G/DL (ref 12–15.9)
IMM GRANULOCYTES # BLD AUTO: 0.05 10*3/MM3 (ref 0–0.05)
IMM GRANULOCYTES NFR BLD AUTO: 0.4 % (ref 0–0.5)
LH SERPL-ACNC: 33 MIU/ML
LYMPHOCYTES # BLD AUTO: 3.7 10*3/MM3 (ref 0.7–3.1)
LYMPHOCYTES NFR BLD AUTO: 31.4 % (ref 19.6–45.3)
MCH RBC QN AUTO: 30 PG (ref 26.6–33)
MCHC RBC AUTO-ENTMCNC: 34.8 G/DL (ref 31.5–35.7)
MCV RBC AUTO: 86.4 FL (ref 79–97)
MONOCYTES # BLD AUTO: 0.7 10*3/MM3 (ref 0.1–0.9)
MONOCYTES NFR BLD AUTO: 5.9 % (ref 5–12)
NEUTROPHILS NFR BLD AUTO: 59.2 % (ref 42.7–76)
NEUTROPHILS NFR BLD AUTO: 6.96 10*3/MM3 (ref 1.7–7)
NRBC BLD AUTO-RTO: 0 /100 WBC (ref 0–0.2)
PLATELET # BLD AUTO: 338 10*3/MM3 (ref 140–450)
PMV BLD AUTO: 12.1 FL (ref 6–12)
POTASSIUM SERPL-SCNC: 4 MMOL/L (ref 3.5–5.2)
PROGEST SERPL-MCNC: 2.52 NG/ML
PROLACTIN SERPL-MCNC: 4.94 NG/ML (ref 4.79–23.3)
PROT SERPL-MCNC: 8.3 G/DL (ref 6–8.5)
RBC # BLD AUTO: 5.06 10*6/MM3 (ref 3.77–5.28)
SODIUM SERPL-SCNC: 142 MMOL/L (ref 136–145)
URATE SERPL-MCNC: 6.3 MG/DL (ref 2.4–5.7)
WBC NRBC COR # BLD AUTO: 11.78 10*3/MM3 (ref 3.4–10.8)

## 2025-03-27 PROCEDURE — 84144 ASSAY OF PROGESTERONE: CPT

## 2025-03-27 PROCEDURE — 86235 NUCLEAR ANTIGEN ANTIBODY: CPT

## 2025-03-27 PROCEDURE — 82670 ASSAY OF TOTAL ESTRADIOL: CPT

## 2025-03-27 PROCEDURE — 86160 COMPLEMENT ANTIGEN: CPT

## 2025-03-27 PROCEDURE — 86255 FLUORESCENT ANTIBODY SCREEN: CPT

## 2025-03-27 PROCEDURE — 82533 TOTAL CORTISOL: CPT

## 2025-03-27 PROCEDURE — 85025 COMPLETE CBC W/AUTO DIFF WBC: CPT

## 2025-03-27 PROCEDURE — 80053 COMPREHEN METABOLIC PANEL: CPT

## 2025-03-27 PROCEDURE — 83002 ASSAY OF GONADOTROPIN (LH): CPT

## 2025-03-27 PROCEDURE — 84550 ASSAY OF BLOOD/URIC ACID: CPT

## 2025-03-27 PROCEDURE — 84146 ASSAY OF PROLACTIN: CPT

## 2025-03-27 PROCEDURE — 86038 ANTINUCLEAR ANTIBODIES: CPT

## 2025-03-27 PROCEDURE — 83001 ASSAY OF GONADOTROPIN (FSH): CPT

## 2025-03-27 PROCEDURE — 84403 ASSAY OF TOTAL TESTOSTERONE: CPT

## 2025-03-27 PROCEDURE — 86431 RHEUMATOID FACTOR QUANT: CPT

## 2025-03-27 PROCEDURE — 84402 ASSAY OF FREE TESTOSTERONE: CPT

## 2025-03-27 PROCEDURE — 86140 C-REACTIVE PROTEIN: CPT

## 2025-03-27 PROCEDURE — 82679 ASSAY OF ESTRONE: CPT

## 2025-03-27 PROCEDURE — 85652 RBC SED RATE AUTOMATED: CPT

## 2025-03-27 PROCEDURE — 86225 DNA ANTIBODY NATIVE: CPT

## 2025-03-27 RX ORDER — OMEPRAZOLE 40 MG/1
40 CAPSULE, DELAYED RELEASE ORAL DAILY
Qty: 30 CAPSULE | Refills: 0 | Status: SHIPPED | OUTPATIENT
Start: 2025-03-27 | End: 2025-03-27 | Stop reason: SDUPTHER

## 2025-03-27 RX ORDER — OMEPRAZOLE 40 MG/1
40 CAPSULE, DELAYED RELEASE ORAL DAILY
Qty: 30 CAPSULE | Refills: 0 | Status: SHIPPED | OUTPATIENT
Start: 2025-03-27

## 2025-03-27 NOTE — TELEPHONE ENCOUNTER
Caller: Opal Michaels    Relationship: Self    Best call back number:  [phone] (Home)     Who are you requesting to speak with (clinical staff, provider,  specific staff member): CLINICAL       What was the call regarding: PATIENT SAID SHE IS ON A HIGH DOSE OF ANTIBIOTIC AND SHE TALKED WITH DR GALINDO THIS MORNING ABOUT SENDING IN DIFLUCAN FOR A YEAST INFECTION    PLEASE TO   [facility] [place]

## 2025-03-27 NOTE — PATIENT INSTRUCTIONS
Change red bull to caffeine tab 100mg to get b12 down  Other labs as ordered for inflammation/hormones  Start reflux pill  Imaging today

## 2025-03-27 NOTE — PROGRESS NOTES
Established Patient Office Visit      Patient Name: Opal Michaels  : 1975   MRN: [id number]   Care Team: Patient Care Team:  Don Daley DO as PCP - General (Family Medicine)    Chief Complaint:    Chief Complaint   Patient presents with    increased thirst     1 wk follow up, pt states she is still having symptoms    Pain       History of Present Illness: Opal Michaels is a 50 y.o. female who is here today for chief complaint.      Follow up on bloodwork  Symptoms are: chronic.   Onset was 1 to 5 years.   Symptoms occur: constantly.  Symptoms include: abdominal pain, joint pain, cough, fatigue, headaches, myalgias, dysuria, visual change and weakness.   Pertinent negative symptoms include no anorexia, no diaphoresis, no fever, no nausea, no neck pain, no numbness, no rash, no vertigo and no vomiting.   Treatment and/or Medications comments include: On file       Pain under both breasts, right flank pain  Was supposed to start amox 875mg from dentist but held off bc of labs, never ended up taking it at all.    Hysterectomy, 1 oveary left plus cervical stump    B12 very high. Used to take B12 supplement, doesn't any more, but does drink a red bull every day    Elev CRP, but had root canal 1 week prior    CT chest due   Smoking 10 cigarettes per day since , ~30 pack-year. Nighttime chronic cough (worse when lying on right side, so may be reflux related rekha with weight gain). Cough has been present for over 3 months despite abx, empiric treatment, steroid.    On Kristi patch plus progesterone 100mg caps daily    This patient is accompanied by their self who contributes to the history of their care.    The following portions of the patient's history were reviewed and updated as appropriate: allergies, current medications, past family history, past medical history, past social history, past surgical history and problem list.    Subjective      Review of Systems:   Review of Systems    Constitutional:  Positive for fatigue. Negative for diaphoresis and fever.   Respiratory:  Positive for cough.    Gastrointestinal:  Positive for abdominal pain. Negative for anorexia, nausea and vomiting.   Genitourinary:  Positive for dysuria.   Musculoskeletal:  Positive for joint pain and myalgias. Negative for neck pain.   Skin:  Negative for rash.   Neurological:  Positive for weakness. Negative for vertigo and numbness.    - See HPI    Past Medical History:   Past Medical History:   Diagnosis Date    Depression     Endometriosis     Fibromyalgia, primary     Gall stones     History of stomach ulcers     Kidney stone     PCOS (polycystic ovarian syndrome)        Past Surgical History:   Past Surgical History:   Procedure Laterality Date    CHOLECYSTECTOMY      HYSTERECTOMY      TUBAL ABDOMINAL LIGATION         Family History:   Family History   Problem Relation Age of Onset    Hyperlipidemia Mother     Hypertension Mother     Hepatitis Mother         FAITH    Hyperlipidemia Father     Hypertension Father     No Known Problems Sister     No Known Problems Brother     Diabetes Maternal Grandmother     Diabetes Maternal Grandfather     Stroke Paternal Grandmother     Cancer Paternal Grandfather        Social History:   Social History     Socioeconomic History    Marital status:    Tobacco Use    Smoking status: Former     Current packs/day: 0.00     Average packs/day: 1 pack/day for 25.3 years (25.3 ttl pk-yrs)     Types: Cigarettes     Start date: 1995     Quit date: 2020     Years since quittin.8    Smokeless tobacco: Never   Vaping Use    Vaping status: Never Used   Substance and Sexual Activity    Alcohol use: No    Drug use: No    Sexual activity: Defer       Tobacco History:   Social History     Tobacco Use   Smoking Status Former    Current packs/day: 0.00    Average packs/day: 1 pack/day for 25.3 years (25.3 ttl pk-yrs)    Types: Cigarettes    Start date: 1995    Quit date:  "2020    Years since quittin.8   Smokeless Tobacco Never       Medications:   Outpatient Medications Prior to Visit   Medication Sig Dispense Refill    Kristi 0.05 MG/24HR patch APPLY 1 PATCH TOPICALLY TWICE A WEEK      ibuprofen (ADVIL,MOTRIN) 600 MG tablet Take 1 tablet by mouth Every 6 (Six) Hours As Needed for Mild Pain.      Progesterone (PROMETRIUM) 100 MG capsule Take 1 capsule by mouth Daily.      amoxicillin (AMOXIL) 875 MG tablet  (Patient not taking: Reported on 3/27/2025)       No facility-administered medications prior to visit.        Allergies:   No Known Allergies    Objective   Objective     Physical Exam:  Vital Signs:   Vitals:    25 1009   BP: 114/80   Pulse: 93   SpO2: 99%   Weight: 73.1 kg (161 lb 3.2 oz)   Height: 162.6 cm (64.02\")     Body mass index is 27.66 kg/m².     Physical Exam  Nursing note reviewed  Const: NAD, A&Ox4, Pleasant, Cooperative  Eyes: EOMI, no conjunctivitis  ENT: No nasal discharge present, neck supple  Cardiac: Regular rate and rhythm, no cyanosis  Resp: Respiratory rate within normal limits, no increased work of breathing, no audible wheezing or retractions noted  GI: No distention or ascites  MSK: Motor and sensation grossly intact in bilateral upper extremities  Neurologic: CN II-XII grossly intact  Psych: Appropriate mood and behavior.  Skin: Warm, dry  Procedures/Radiology     Procedures  No radiology results for the last 7 days     Assessment & Plan   Assessment / Plan      Assessment/Plan:   Problems Addressed This Visit  Diagnoses and all orders for this visit:    1. Perimenopausal symptom (Primary)  -     Progesterone; Future    2. Elevated C-reactive protein (CRP)  -     CT Chest Without Contrast; Future  -     CT Abdomen Pelvis With & Without Contrast; Future  -     Prolactin; Future  -     Testosterone (Free & Total), LC / MS; Future  -     Estrogens, Fractionated; Future  -     FSH & LH; Future  -     Progesterone; Future  -     Cortisol; " Future  -     dsDNA Antibody by IFA, Crithidia luciliae, with Reflex to Titer; Future  -     FAMILIA by IFA, Reflex 9-biomarkers profile; Future  -     Anti-Smith Antibody; Future  -     C-reactive Protein; Future  -     Sedimentation Rate; Future  -     Uric Acid; Future  -     Rheumatoid Factor; Future  -     C4+C3; Future  -     CBC & Differential; Future  -     Comprehensive Metabolic Panel; Future    3. High serum vitamin B12    4. Lethargy  -     Prolactin; Future  -     Testosterone (Free & Total), LC / MS; Future  -     Estrogens, Fractionated; Future  -     FSH & LH; Future  -     Progesterone; Future  -     Cortisol; Future  -     dsDNA Antibody by IFA, Crithidia luciliae, with Reflex to Titer; Future  -     FAMILIA by IFA, Reflex 9-biomarkers profile; Future  -     Anti-Smith Antibody; Future  -     C-reactive Protein; Future  -     Sedimentation Rate; Future  -     Uric Acid; Future  -     Rheumatoid Factor; Future  -     C4+C3; Future  -     CBC & Differential; Future  -     Comprehensive Metabolic Panel; Future    5. Smoking  -     CT Chest Without Contrast; Future    6. Abdominal bloating  -     CT Abdomen Pelvis With & Without Contrast; Future  -     Prolactin; Future  -     Testosterone (Free & Total), LC / MS; Future  -     Estrogens, Fractionated; Future  -     FSH & LH; Future  -     Progesterone; Future  -     Cortisol; Future  -     dsDNA Antibody by IFA, Crithidia luciliae, with Reflex to Titer; Future  -     FAMILIA by IFA, Reflex 9-biomarkers profile; Future  -     Anti-Smith Antibody; Future  -     C-reactive Protein; Future  -     Sedimentation Rate; Future  -     Uric Acid; Future  -     Rheumatoid Factor; Future  -     C4+C3; Future  -     CBC & Differential; Future  -     Comprehensive Metabolic Panel; Future    7. Increased thirst    8. Persistent cough for 3 weeks or longer  -     CT Chest Without Contrast; Future  -     Prolactin; Future  -     Testosterone (Free & Total), LC / MS; Future  -      Estrogens, Fractionated; Future  -     FSH & LH; Future  -     Progesterone; Future  -     Cortisol; Future  -     dsDNA Antibody by IFA, Crithidia luciliae, with Reflex to Titer; Future  -     FAMILIA by IFA, Reflex 9-biomarkers profile; Future  -     Anti-Smith Antibody; Future  -     C-reactive Protein; Future  -     Sedimentation Rate; Future  -     Uric Acid; Future  -     Rheumatoid Factor; Future  -     C4+C3; Future  -     CBC & Differential; Future  -     Comprehensive Metabolic Panel; Future    9. Gastroesophageal reflux disease without esophagitis  -     omeprazole (priLOSEC) 40 MG capsule; Take 1 capsule by mouth Daily.  Dispense: 30 capsule; Refill: 0    10. Night sweats      Problem List Items Addressed This Visit          Tobacco    Smoking    Relevant Orders    CT Chest Without Contrast     Other Visit Diagnoses         Perimenopausal symptom    -  Primary    Relevant Orders    Progesterone      Elevated C-reactive protein (CRP)        Relevant Orders    CT Chest Without Contrast    CT Abdomen Pelvis With & Without Contrast    Prolactin    Testosterone (Free & Total), LC / MS    Estrogens, Fractionated    FSH & LH    Progesterone    Cortisol    dsDNA Antibody by IFA, Crithidia luciliae, with Reflex to Titer    FAMILIA by IFA, Reflex 9-biomarkers profile    Anti-Smith Antibody    C-reactive Protein    Sedimentation Rate    Uric Acid    Rheumatoid Factor    C4+C3    CBC & Differential    Comprehensive Metabolic Panel      High serum vitamin B12          Lethargy        Relevant Orders    Prolactin    Testosterone (Free & Total), LC / MS    Estrogens, Fractionated    FSH & LH    Progesterone    Cortisol    dsDNA Antibody by IFA, Crithidia luciliae, with Reflex to Titer    FAMILIA by IFA, Reflex 9-biomarkers profile    Anti-Smith Antibody    C-reactive Protein    Sedimentation Rate    Uric Acid    Rheumatoid Factor    C4+C3    CBC & Differential    Comprehensive Metabolic Panel      Abdominal bloating         Relevant Orders    CT Abdomen Pelvis With & Without Contrast    Prolactin    Testosterone (Free & Total), LC / MS    Estrogens, Fractionated    FSH & LH    Progesterone    Cortisol    dsDNA Antibody by IFA, Crithidia luciliae, with Reflex to Titer    FAMILIA by IFA, Reflex 9-biomarkers profile    Anti-Smith Antibody    C-reactive Protein    Sedimentation Rate    Uric Acid    Rheumatoid Factor    C4+C3    CBC & Differential    Comprehensive Metabolic Panel      Increased thirst          Persistent cough for 3 weeks or longer        Relevant Orders    CT Chest Without Contrast    Prolactin    Testosterone (Free & Total), LC / MS    Estrogens, Fractionated    FSH & LH    Progesterone    Cortisol    dsDNA Antibody by IFA, Crithidia luciliae, with Reflex to Titer    FAMILIA by IFA, Reflex 9-biomarkers profile    Anti-Smith Antibody    C-reactive Protein    Sedimentation Rate    Uric Acid    Rheumatoid Factor    C4+C3    CBC & Differential    Comprehensive Metabolic Panel      Gastroesophageal reflux disease without esophagitis        Relevant Medications    omeprazole (priLOSEC) 40 MG capsule      Night sweats                Patient Instructions   Change red bull to caffeine tab 100mg to get b12 down  Other labs as ordered for inflammation/hormones  Start reflux pill  Imaging today    Follow Up:   Return in about 1 week (around 4/3/2025) for video visit.    DO CLAUDIO Washington RD  [facility]  [address] [facility] [id number]  Fax [phone]  Phone [phone]    Disclaimer to patients: The 21st Century Cares Act makes medical notes like these available to patients in the interest of transparency. However, please be advised that this is still a medical document. It is intended as [id number] communication. Many sections may include medical language or jargon, abbreviations, and additional verbiage that are unfamiliar or confusing. In some ways it  may come across as blunt, direct, or may be summarized in order to clearly and concisely communicate the most crucial information to medical professionals. It may also include mentions of conditions that are unlikely but considered as part of the differential diagnosis, including serious disorders. These are not always discussed at length at the time of appointment because their likelihood is so low, but may be included in a medical note to make it clear what has been considered and/or ruled out as part of a work-up. Medical documents are intended to carry relevant information, facts as evident, and the personal clinical opinion of the physician. If you have any questions regarding this medical document, please bring them to the attention of the physician at your next scheduled appointment.

## 2025-03-27 NOTE — TELEPHONE ENCOUNTER
Caller: Opal Michaels    Relationship: Self    Best call back number: [phone]     Requested Prescriptions:   Requested Prescriptions     Pending Prescriptions Disp Refills    omeprazole (priLOSEC) 40 MG capsule 30 capsule 0     Sig: Take 1 capsule by mouth Daily.        Pharmacy where request should be sent: [facility] [address] [place] [address] [phone] [facility] [phone]      Last office visit with prescribing clinician: 3/27/2025   Last telemedicine visit with prescribing clinician: Visit date not found   Next office visit with prescribing clinician: 4/3/2025     Additional details provided by patient: SENT TO [facility] - PLEASE SEND TO [facility]     Does the patient have less than a 3 day supply:  [] Yes  [] No    Would you like a call back once the refill request has been completed: [] Yes [x] No    If the office needs to give you a call back, can they leave a voicemail: [] Yes [x] No    Kathleen Ramírez MA   03/27/25 11:38 EDT

## 2025-03-28 LAB — ENA SM AB SER-ACNC: <0.2 AI (ref 0–0.9)

## 2025-03-28 NOTE — TELEPHONE ENCOUNTER
Caller: Opal Michaels    Relationship: Self    Best call back number: [phone]     What was the call regarding: PATIENT IS CALLING TO GET UPDATE ON THIS. PATIENT WOULD LIKE THE DIFLUCAN SENT TO [facility] BEFORE THE END OF THE DAY TODAY 03.28.25. THANK YOU.

## 2025-03-31 ENCOUNTER — TELEPHONE (OUTPATIENT)
Dept: FAMILY MEDICINE CLINIC | Facility: CLINIC | Age: 50
End: 2025-03-31
Payer: MEDICAID

## 2025-03-31 LAB — DSDNA AB SER QL CLIF: NEGATIVE

## 2025-03-31 RX ORDER — FLUCONAZOLE 150 MG/1
150 TABLET ORAL ONCE
Qty: 1 TABLET | Refills: 0 | Status: SHIPPED | OUTPATIENT
Start: 2025-03-31 | End: 2025-03-31

## 2025-03-31 NOTE — TELEPHONE ENCOUNTER
Spoke with patient regarding CT scans. They were cancelled due to the insurance denying them. Reordered as routine and hopefully insurance will cover after it's appealed. Notified patient per Dr. Daley, going to the ER is an option if symptoms worsen, however, he cannot guarantee they would do one or both of the CT's that he ordered. She understands that insurance may or may not approve the CT scans, but if they approve, schedulers will call to schedule.     She did start the antibiotic on Friday, hopefull that will help. She is requesting diflucan which Dr. Daley will send in for her.

## 2025-04-01 LAB
ESTRADIOL SERPL-MCNC: 180 PG/ML
ESTRONE SERPL-MCNC: 132 PG/ML

## 2025-04-02 ENCOUNTER — TELEPHONE (OUTPATIENT)
Dept: FAMILY MEDICINE CLINIC | Facility: CLINIC | Age: 50
End: 2025-04-02

## 2025-04-02 ENCOUNTER — APPOINTMENT (OUTPATIENT)
Dept: GENERAL RADIOLOGY | Facility: HOSPITAL | Age: 50
End: 2025-04-02
Payer: MEDICAID

## 2025-04-02 ENCOUNTER — APPOINTMENT (OUTPATIENT)
Dept: CT IMAGING | Facility: HOSPITAL | Age: 50
End: 2025-04-02
Payer: MEDICAID

## 2025-04-02 ENCOUNTER — HOSPITAL ENCOUNTER (EMERGENCY)
Facility: HOSPITAL | Age: 50
Discharge: HOME | End: 2025-04-02
Attending: EMERGENCY MEDICINE
Payer: MEDICAID

## 2025-04-02 VITALS
BODY MASS INDEX: 27.49 KG/M2 | TEMPERATURE: 98.5 F | WEIGHT: 161 LBS | HEIGHT: 64 IN | DIASTOLIC BLOOD PRESSURE: 81 MMHG | OXYGEN SATURATION: 97 % | SYSTOLIC BLOOD PRESSURE: 127 MMHG | HEART RATE: 97 BPM | RESPIRATION RATE: 18 BRPM

## 2025-04-02 DIAGNOSIS — F17.200 SMOKER: ICD-10-CM

## 2025-04-02 DIAGNOSIS — R10.9 ACUTE RIGHT FLANK PAIN: Primary | ICD-10-CM

## 2025-04-02 LAB
ALBUMIN SERPL-MCNC: 4.4 G/DL (ref 3.5–5.2)
ALBUMIN/GLOB SERPL: 1.6 G/DL
ALP SERPL-CCNC: 92 U/L (ref 39–117)
ALT SERPL W P-5'-P-CCNC: 10 U/L (ref 1–33)
ANA SER QL IF: POSITIVE
ANA SPECKLED TITR SER: NORMAL {TITER}
ANION GAP SERPL CALCULATED.3IONS-SCNC: 16 MMOL/L (ref 5–15)
AST SERPL-CCNC: 15 U/L (ref 1–32)
BACTERIA UR QL AUTO: ABNORMAL /HPF
BASOPHILS # BLD AUTO: 0.06 10*3/MM3 (ref 0–0.2)
BASOPHILS NFR BLD AUTO: 0.5 % (ref 0–1.5)
BILIRUB SERPL-MCNC: <0.2 MG/DL (ref 0–1.2)
BILIRUB UR QL STRIP: NEGATIVE
BUN SERPL-MCNC: 11 MG/DL (ref 6–20)
BUN/CREAT SERPL: 10.4 (ref 7–25)
CALCIUM SPEC-SCNC: 9.3 MG/DL (ref 8.6–10.5)
CENTROMERE B AB SER-ACNC: <0.2 AI (ref 0–0.9)
CHLORIDE SERPL-SCNC: 101 MMOL/L (ref 98–107)
CHROMATIN AB SERPL-ACNC: <0.2 AI (ref 0–0.9)
CLARITY UR: ABNORMAL
CO2 SERPL-SCNC: 24 MMOL/L (ref 22–29)
COLOR UR: YELLOW
CREAT SERPL-MCNC: 1.06 MG/DL (ref 0.57–1)
D DIMER PPP FEU-MCNC: <0.27 MCGFEU/ML (ref 0–0.5)
DEPRECATED RDW RBC AUTO: 43.8 FL (ref 37–54)
DSDNA AB SER-ACNC: 2 IU/ML (ref 0–9)
EGFRCR SERPLBLD CKD-EPI 2021: 64.9 ML/MIN/1.73
ENA JO1 AB SER-ACNC: <0.2 AI (ref 0–0.9)
ENA RNP AB SER-ACNC: <0.2 AI (ref 0–0.9)
ENA SCL70 AB SER-ACNC: 0.5 AI (ref 0–0.9)
ENA SM AB SER-ACNC: <0.2 AI (ref 0–0.9)
ENA SS-A AB SER-ACNC: <0.2 AI (ref 0–0.9)
ENA SS-B AB SER-ACNC: <0.2 AI (ref 0–0.9)
EOSINOPHIL # BLD AUTO: 0.15 10*3/MM3 (ref 0–0.4)
EOSINOPHIL NFR BLD AUTO: 1.2 % (ref 0.3–6.2)
ERYTHROCYTE [DISTWIDTH] IN BLOOD BY AUTOMATED COUNT: 13.4 % (ref 12.3–15.4)
GEN 5 1HR TROPONIN T REFLEX: 8 NG/L
GLOBULIN UR ELPH-MCNC: 2.7 GM/DL
GLUCOSE SERPL-MCNC: 109 MG/DL (ref 65–99)
GLUCOSE UR STRIP-MCNC: NEGATIVE MG/DL
HCT VFR BLD AUTO: 39.6 % (ref 34–46.6)
HGB BLD-MCNC: 12.9 G/DL (ref 12–15.9)
HGB UR QL STRIP.AUTO: NEGATIVE
HOLD SPECIMEN: NORMAL
HYALINE CASTS UR QL AUTO: ABNORMAL /LPF
IMM GRANULOCYTES # BLD AUTO: 0.06 10*3/MM3 (ref 0–0.05)
IMM GRANULOCYTES NFR BLD AUTO: 0.5 % (ref 0–0.5)
KETONES UR QL STRIP: ABNORMAL
LABORATORY COMMENT REPORT: ABNORMAL
LEUKOCYTE ESTERASE UR QL STRIP.AUTO: NEGATIVE
LIPASE SERPL-CCNC: 26 U/L (ref 13–60)
LYMPHOCYTES # BLD AUTO: 3.81 10*3/MM3 (ref 0.7–3.1)
LYMPHOCYTES NFR BLD AUTO: 30.7 % (ref 19.6–45.3)
Lab: NORMAL
Lab: NORMAL
MCH RBC QN AUTO: 29.1 PG (ref 26.6–33)
MCHC RBC AUTO-ENTMCNC: 32.6 G/DL (ref 31.5–35.7)
MCV RBC AUTO: 89.4 FL (ref 79–97)
MONOCYTES # BLD AUTO: 0.54 10*3/MM3 (ref 0.1–0.9)
MONOCYTES NFR BLD AUTO: 4.3 % (ref 5–12)
NEUTROPHILS NFR BLD AUTO: 62.8 % (ref 42.7–76)
NEUTROPHILS NFR BLD AUTO: 7.81 10*3/MM3 (ref 1.7–7)
NITRITE UR QL STRIP: NEGATIVE
NRBC BLD AUTO-RTO: 0 /100 WBC (ref 0–0.2)
NT-PROBNP SERPL-MCNC: 37.1 PG/ML (ref 0–450)
PH UR STRIP.AUTO: 5.5 [PH] (ref 5–8)
PLATELET # BLD AUTO: 288 10*3/MM3 (ref 140–450)
PMV BLD AUTO: 11.5 FL (ref 6–12)
POTASSIUM SERPL-SCNC: 3.5 MMOL/L (ref 3.5–5.2)
PROT SERPL-MCNC: 7.1 G/DL (ref 6–8.5)
PROT UR QL STRIP: ABNORMAL
RBC # BLD AUTO: 4.43 10*6/MM3 (ref 3.77–5.28)
RBC # UR STRIP: ABNORMAL /HPF
REF LAB TEST METHOD: ABNORMAL
SODIUM SERPL-SCNC: 141 MMOL/L (ref 136–145)
SP GR UR STRIP: 1.02 (ref 1–1.03)
SQUAMOUS #/AREA URNS HPF: ABNORMAL /HPF
TROPONIN T NUMERIC DELTA: -1 NG/L
TROPONIN T SERPL HS-MCNC: 9 NG/L
UROBILINOGEN UR QL STRIP: ABNORMAL
WBC # UR STRIP: ABNORMAL /HPF
WBC NRBC COR # BLD AUTO: 12.43 10*3/MM3 (ref 3.4–10.8)
WHOLE BLOOD HOLD COAG: NORMAL
WHOLE BLOOD HOLD SPECIMEN: NORMAL
YEAST URNS QL MICRO: ABNORMAL /HPF

## 2025-04-02 PROCEDURE — 83880 ASSAY OF NATRIURETIC PEPTIDE: CPT | Performed by: EMERGENCY MEDICINE

## 2025-04-02 PROCEDURE — 80053 COMPREHEN METABOLIC PANEL: CPT | Performed by: EMERGENCY MEDICINE

## 2025-04-02 PROCEDURE — 36415 COLL VENOUS BLD VENIPUNCTURE: CPT

## 2025-04-02 PROCEDURE — 85025 COMPLETE CBC W/AUTO DIFF WBC: CPT | Performed by: EMERGENCY MEDICINE

## 2025-04-02 PROCEDURE — 71046 X-RAY EXAM CHEST 2 VIEWS: CPT

## 2025-04-02 PROCEDURE — 81001 URINALYSIS AUTO W/SCOPE: CPT | Performed by: EMERGENCY MEDICINE

## 2025-04-02 PROCEDURE — 93005 ELECTROCARDIOGRAM TRACING: CPT | Performed by: EMERGENCY MEDICINE

## 2025-04-02 PROCEDURE — 25510000001 IOPAMIDOL 61 % SOLUTION: Performed by: EMERGENCY MEDICINE

## 2025-04-02 PROCEDURE — 85379 FIBRIN DEGRADATION QUANT: CPT | Performed by: EMERGENCY MEDICINE

## 2025-04-02 PROCEDURE — 83690 ASSAY OF LIPASE: CPT | Performed by: EMERGENCY MEDICINE

## 2025-04-02 PROCEDURE — 74177 CT ABD & PELVIS W/CONTRAST: CPT

## 2025-04-02 PROCEDURE — 84484 ASSAY OF TROPONIN QUANT: CPT | Performed by: EMERGENCY MEDICINE

## 2025-04-02 PROCEDURE — 99285 EMERGENCY DEPT VISIT HI MDM: CPT

## 2025-04-02 RX ORDER — SODIUM CHLORIDE 9 MG/ML
10 INJECTION, SOLUTION INTRAMUSCULAR; INTRAVENOUS; SUBCUTANEOUS AS NEEDED
Status: DISCONTINUED | OUTPATIENT
Start: 2025-04-02 | End: 2025-04-02 | Stop reason: HOSPADM

## 2025-04-02 RX ORDER — NICOTINE 21 MG/24HR
1 PATCH, TRANSDERMAL 24 HOURS TRANSDERMAL EVERY 24 HOURS
Qty: 30 EACH | Refills: 0 | Status: SHIPPED | OUTPATIENT
Start: 2025-04-02

## 2025-04-02 RX ORDER — IOPAMIDOL 612 MG/ML
85 INJECTION, SOLUTION INTRAVASCULAR
Status: COMPLETED | OUTPATIENT
Start: 2025-04-02 | End: 2025-04-02

## 2025-04-02 RX ADMIN — IOPAMIDOL 85 ML: 612 INJECTION, SOLUTION INTRAVENOUS at 20:37

## 2025-04-02 NOTE — ED PROVIDER NOTES
[place]    EMERGENCY DEPARTMENT ENCOUNTER      Pt Name: Opal Michaels  MRN: [id number]  YOB: 1975  Date of evaluation: 4/2/2025  Provider: Inocencio Spangler MD    CHIEF COMPLAINT       Chief Complaint   Patient presents with    Flank Pain         HISTORY OF PRESENT ILLNESS   Opal Michaels is a 50 y.o. female who presents to the emergency department with complaint of sharp right-sided flank pain along with dry cough that has been going on since January.  Pain is not pleuritic and is not associated with any chest pain or tightness, difficulty breathing, fever, or chills.  She denies history of urinary stones.  She has not had any dysuria, frequency, or urgency.  She is an ongoing smoker.  She reports recent lab work performed by PCP with elevated white blood cell count.      Nursing notes were reviewed.    REVIEW OF SYSTEMS     ROS:  A chief complaint appropriate review of systems was completed and is negative except as noted in the HPI.      PAST MEDICAL HISTORY     Past Medical History:   Diagnosis Date    Depression     Endometriosis     Fibromyalgia, primary     Gall stones     History of stomach ulcers     Kidney stone     PCOS (polycystic ovarian syndrome)          SURGICAL HISTORY       Past Surgical History:   Procedure Laterality Date    CHOLECYSTECTOMY      HYSTERECTOMY      TUBAL ABDOMINAL LIGATION           CURRENT MEDICATIONS     No current facility-administered medications for this encounter.    Current Outpatient Medications:     Kristi 0.05 MG/24HR patch, APPLY 1 PATCH TOPICALLY TWICE A WEEK, Disp: , Rfl:     ibuprofen (ADVIL,MOTRIN) 600 MG tablet, Take 1 tablet by mouth Every 6 (Six) Hours As Needed for Mild Pain., Disp: , Rfl:     nicotine (NICODERM CQ) 21 MG/24HR patch, Place 1 patch on the skin as directed by provider Daily., Disp: 30 each, Rfl: 0    omeprazole (priLOSEC) 40 MG capsule, Take 1 capsule by mouth Daily., Disp: 30 capsule, Rfl: 0    Progesterone (PROMETRIUM) 100  MG capsule, Take 1 capsule by mouth Daily., Disp: , Rfl:     ALLERGIES     Patient has no known allergies.    FAMILY HISTORY       Family History   Problem Relation Age of Onset    Hyperlipidemia Mother     Hypertension Mother     Hepatitis Mother         FAITH    Hyperlipidemia Father     Hypertension Father     No Known Problems Sister     No Known Problems Brother     Diabetes Maternal Grandmother     Diabetes Maternal Grandfather     Stroke Paternal Grandmother     Cancer Paternal Grandfather           SOCIAL HISTORY       Social History     Socioeconomic History    Marital status:    Tobacco Use    Smoking status: Former     Current packs/day: 0.00     Average packs/day: 1 pack/day for 25.3 years (25.3 ttl pk-yrs)     Types: Cigarettes     Start date: 1995     Quit date: 2020     Years since quittin.9    Smokeless tobacco: Never   Vaping Use    Vaping status: Never Used   Substance and Sexual Activity    Alcohol use: No    Drug use: No    Sexual activity: Defer         PHYSICAL EXAM    (up to 7 for level 4, 8 or more for level 5)     Vitals:    25 2100 25 2115 25 2130 25 2145   BP: 115/84 116/93 119/82 127/81   BP Location:       Patient Position:       Pulse:       Resp:       Temp:       TempSrc:       SpO2:       Weight:       Height:           General: Awake, alert, no acute distress.  HEENT: Conjunctivae normal.  Neck: Trachea midline.  Cardiac: Heart regular rate, rhythm, no murmurs, rubs, or gallops  Lungs: Lungs are clear to auscultation, there is no wheezing, rhonchi, or rales. There is no use of accessory muscles.  Chest wall: Moderate tenderness over the right lower chest wall  Abdomen: Abdomen is soft, nontender, nondistended. There are no firm or pulsatile masses, no rebound rigidity or guarding.   Musculoskeletal: No deformity.  Neuro: Alert and oriented x 4.  Dermatology: Skin is warm and dry  Psych: Mentation is grossly normal, cognition is grossly  normal. Affect is appropriate.        DIAGNOSTIC RESULTS     EKG: All EKGs are interpreted by the Emergency Department Physician who either signs or Co-signs this chart in the absence of a cardiologist.    ECG 12 Lead Chest Pain   Preliminary Result   Test Reason : Chest Pain   Blood Pressure :   */*   mmHG   Vent. Rate :  88 BPM     Atrial Rate :  88 BPM      P-R Int : 132 ms          QRS Dur :  94 ms       QT Int : 380 ms       P-R-T Axes :  71  59  51 degrees     QTcB Int : 459 ms      Normal sinus rhythm   Normal ECG   When compared with ECG of 18-May-2020 04:39,   No significant change was found      Referred By: EDMD           Confirmed By:             RADIOLOGY:   [x] Radiologist's Report Reviewed:  CT Abdomen Pelvis With Contrast   Final Result   Impression:   No acute findings in the abdomen or pelvis.         Electronically Signed: Moises Boone     4/2/2025 9:09 PM EDT     Workstation ID: [id number]      XR Chest 2 View   Final Result   Impression:   1.No radiographic evidence of acute cardiopulmonary process.            Electronically Signed: Christian Weaver MD     4/2/2025 7:13 PM EDT     Workstation ID: [id number]          I ordered and independently reviewed the above noted radiographic studies.        LABS:    I have reviewed and interpreted all of the currently available lab results from this visit (if applicable):  Results for orders placed or performed during the hospital encounter of 04/02/25   Comprehensive Metabolic Panel    Collection Time: 04/02/25  6:07 PM    Specimen: Blood   Result Value Ref Range    Glucose 109 (H) 65 - 99 mg/dL    BUN 11 6 - 20 mg/dL    Creatinine 1.06 (H) 0.57 - 1.00 mg/dL    Sodium 141 136 - 145 mmol/L    Potassium 3.5 3.5 - 5.2 mmol/L    Chloride 101 98 - 107 mmol/L    CO2 24.0 22.0 - 29.0 mmol/L    Calcium 9.3 8.6 - 10.5 mg/dL    Total Protein 7.1 6.0 - 8.5 g/dL    Albumin 4.4 3.5 - 5.2 g/dL    ALT (SGPT) 10 1 - 33 U/L    AST (SGOT) 15 1 - 32 U/L    Alkaline  Phosphatase 92 39 - 117 U/L    Total Bilirubin <0.2 0.0 - 1.2 mg/dL    Globulin 2.7 gm/dL    A/G Ratio 1.6 g/dL    BUN/Creatinine Ratio 10.4 7.0 - 25.0    Anion Gap 16.0 (H) 5.0 - 15.0 mmol/L    eGFR 64.9 >60.0 mL/min/1.73   Lipase    Collection Time: 04/02/25  6:07 PM    Specimen: Blood   Result Value Ref Range    Lipase 26 13 - 60 U/L   Urinalysis With Microscopic If Indicated (No Culture) - Urine, Clean Catch    Collection Time: 04/02/25  6:07 PM    Specimen: Urine, Clean Catch   Result Value Ref Range    Color, UA Yellow Yellow, Straw    Appearance, UA Cloudy (A) Clear    pH, UA 5.5 5.0 - 8.0    Specific Gravity, UA 1.021 1.001 - 1.030    Glucose, UA Negative Negative    Ketones, UA Trace (A) Negative    Bilirubin, UA Negative Negative    Blood, UA Negative Negative    Protein,  mg/dL (2+) (A) Negative    Leuk Esterase, UA Negative Negative    Nitrite, UA Negative Negative    Urobilinogen, UA 0.2 E.U./dL 0.2 - 1.0 E.U./dL   CBC Auto Differential    Collection Time: 04/02/25  6:07 PM    Specimen: Blood   Result Value Ref Range    WBC 12.43 (H) 3.40 - 10.80 10*3/mm3    RBC 4.43 3.77 - 5.28 10*6/mm3    Hemoglobin 12.9 12.0 - 15.9 g/dL    Hematocrit 39.6 34.0 - 46.6 %    MCV 89.4 79.0 - 97.0 fL    MCH 29.1 26.6 - 33.0 pg    MCHC 32.6 31.5 - 35.7 g/dL    RDW 13.4 12.3 - 15.4 %    RDW-SD 43.8 37.0 - 54.0 fl    MPV 11.5 6.0 - 12.0 fL    Platelets 288 140 - 450 10*3/mm3    Neutrophil % 62.8 42.7 - 76.0 %    Lymphocyte % 30.7 19.6 - 45.3 %    Monocyte % 4.3 (L) 5.0 - 12.0 %    Eosinophil % 1.2 0.3 - 6.2 %    Basophil % 0.5 0.0 - 1.5 %    Immature Grans % 0.5 0.0 - 0.5 %    Neutrophils, Absolute 7.81 (H) 1.70 - 7.00 10*3/mm3    Lymphocytes, Absolute 3.81 (H) 0.70 - 3.10 10*3/mm3    Monocytes, Absolute 0.54 0.10 - 0.90 10*3/mm3    Eosinophils, Absolute 0.15 0.00 - 0.40 10*3/mm3    Basophils, Absolute 0.06 0.00 - 0.20 10*3/mm3    Immature Grans, Absolute 0.06 (H) 0.00 - 0.05 10*3/mm3    nRBC 0.0 0.0 - 0.2 /100 WBC    High Sensitivity Troponin T    Collection Time: 04/02/25  6:07 PM    Specimen: Blood   Result Value Ref Range    HS Troponin T 9 <14 ng/L   BNP    Collection Time: 04/02/25  6:07 PM    Specimen: Blood   Result Value Ref Range    proBNP 37.1 0.0 - 450.0 pg/mL   D-dimer, Quantitative    Collection Time: 04/02/25  6:07 PM    Specimen: Blood   Result Value Ref Range    D-Dimer, Quantitative <0.27 0.00 - 0.50 MCGFEU/mL   Urinalysis, Microscopic Only - Urine, Clean Catch    Collection Time: 04/02/25  6:07 PM    Specimen: Urine, Clean Catch   Result Value Ref Range    RBC, UA 11-20 (A) None Seen, 0-2 /HPF    WBC, UA 0-2 None Seen, 0-2 /HPF    Bacteria, UA Trace None Seen, Trace /HPF    Squamous Epithelial Cells, UA 21-30 (A) None Seen, 0-2 /HPF    Yeast, UA Small/1+ Budding Yeast w/Hyphae (A) None Seen /HPF    Hyaline Casts, UA None Seen 0 - 6 /LPF    Methodology Manual Light Microscopy    Green Top (Gel)    Collection Time: 04/02/25  6:07 PM   Result Value Ref Range    Extra Tube Hold for add-ons.    Lavender Top    Collection Time: 04/02/25  6:07 PM   Result Value Ref Range    Extra Tube hold for add-on    Gold Top - SST    Collection Time: 04/02/25  6:07 PM   Result Value Ref Range    Extra Tube Hold for add-ons.    Gray Top    Collection Time: 04/02/25  6:07 PM   Result Value Ref Range    Extra Tube Hold for add-ons.    Light Blue Top    Collection Time: 04/02/25  6:07 PM   Result Value Ref Range    Extra Tube Hold for add-ons.    ECG 12 Lead Chest Pain    Collection Time: 04/02/25  7:28 PM   Result Value Ref Range    QT Interval 380 ms    QTC Interval 459 ms   High Sensitivity Troponin T 1Hr    Collection Time: 04/02/25  7:40 PM    Specimen: Blood   Result Value Ref Range    HS Troponin T 8 <14 ng/L    Troponin T Numeric Delta -1 Abnormal if >/=3 ng/L        If labs were ordered, I independently reviewed the results and considered them in treating the patient.      EMERGENCY DEPARTMENT COURSE and DIFFERENTIAL  DIAGNOSIS/MDM:   Vitals:  AS OF 02:09 EDT    BP - 127/81  HR - 97  TEMP - 98.5 °F (36.9 °C) (Oral)  O2 SATS - 97%        Discussion below represents my analysis of pertinent findings related to patient's condition, differential diagnosis, treatment plan and final disposition.      Differential diagnosis:  The differential diagnosis associated with the patient's presentation includes: ACS, pneumonia, pneumothorax, pulmonary embolism, urinary tract infection, biliary pathology, pancreatitis      Independent interpretations (ECG/rhythm strip/X-ray/US/CT scan): I independently interpreted the patient's abdominal CT and chest x-ray.  There is no pulmonary infiltrate and no obstructive change within the abdomen.      Patient's care impacted by:   [] Diabetes   [] Hypertension   [] Coronary Artery Disease   [] Cancer   [x] Other: Smoker    Care significantly affected by Social Determinants of Health (housing and economic circumstances, unemployment)    [] Yes     [x] No   If yes, Patient's care significantly limited by  Social Determinants of Health including:    [] Inadequate housing    [] Low income    [] Alcoholism and drug addiction in family    [] Problems related to primary support group    [] Unemployment    [] Problems related to employment    [] Other Social Determinants of Health:       I considered prescription management with:    [x] Pain medication: I considered IV morphine, however patient's pain is chronic and mild and therefore felt that more conservative management was more appropriate   [] Antiviral:   [] Antibiotic:   [] Other:      ED Course:    ED Course as of 04/03/25 0209 Wed Apr 02, 2025 2140 On reexamination, the patient remains well-appearing and nontoxic.  She is resting comfortably in bed and maintains normal vital signs.  Her right flank pain has been ongoing for a couple of months and I have low suspicion for emergent pathology at this point.  Her chest x-ray shows no evidence of pneumonia  or other abnormality within the thorax.  Her abdominal CT shows no acute pathology, specifically no evidence of urinary stone.  I considered pulmonary embolism, however her D-dimer is negative in the setting of low suspicion, ruling this out.  Labs are otherwise reassuring.  I did discuss smoking cessation with her which she is interested in and will prescribe nicotine patches. [NS]      ED Course User Index  [NS] Inocencio Spangler MD           I had a discussion with the patient/family regarding diagnosis, diagnostic results, treatment plan, and medications.  The patient/family indicated understanding of these instructions.  I spent adequate time at the bedside preceding discharge necessary to personally discuss the aftercare instructions, giving patient education, providing explanations of the results of our evaluations/findings, and my decision making to assure that the patient/family understand the plan of care.  Time was allotted to answer questions at that time and throughout the ED course.  Emphasis was placed on timely follow-up after discharge.  I also discussed the potential for the development of an acute emergent condition requiring further evaluation, admission, or even surgical intervention. I discussed that we found nothing during the visit today indicating the need for further workup, admission, or the presence of an unstable medical condition.  I encouraged the patient to return to the emergency department immediately for ANY concerns, worsening, new complaints, or if symptoms persist and unable to seek follow-up in a timely fashion.  The patient/family expressed understanding and agreement with this plan.  The patient will follow-up with their PCP in 1-2 days for reevaluation.           PROCEDURES:  SMOKING CESSATION COUNSELING  I independently performed smoking cessation counseling with the patient for a total of 6 minutes. I discussed risks associated with smoking, including increased risk of  chronic lung disease, cardiovascular disease, and cancer. I offered to provide resources related to assistance with smoking cessation and also offered to prescribe nicotine replacement therapy, including nicotine gum and patches. The patient is interested in quitting at this time. I am providing discharge instructions related to smoking cessation and the following prescriptions: Nicotine patch.        FINAL IMPRESSION      1. Acute right flank pain    2. Smoker          DISPOSITION/PLAN     ED Disposition       ED Disposition   Discharge    Condition   Stable    Comment   --                 Comment: Please note this report has been produced using speech recognition software.      Inocencio Spangler MD  Attending Emergency Physician             Inocencio Spangler MD  04/03/25 0210

## 2025-04-02 NOTE — TELEPHONE ENCOUNTER
Caller: Opal Michaels    Relationship: Self    Best call back number:      [phone] (Home)     What is the best time to reach you:     ANY TIME    Who are you requesting to speak with (clinical staff, provider,  specific staff member):     DR GALINDO OR NURSE    What was the call regarding:     PATIENT REQUESTED A CALL BACK REGARDING PRIOR AUTHORIZATION APPEAL REQUIRED BY HER INSURANCE FOR CT SCAN OF CHEST/ ABDOMEN, AND PELVIS

## 2025-04-03 ENCOUNTER — TELEMEDICINE (OUTPATIENT)
Dept: FAMILY MEDICINE CLINIC | Facility: CLINIC | Age: 50
End: 2025-04-03
Payer: MEDICAID

## 2025-04-03 DIAGNOSIS — J98.11 ATELECTASIS OF BOTH LUNGS: ICD-10-CM

## 2025-04-03 DIAGNOSIS — R22.2 SOFT TISSUE SWELLING OF CHEST WALL: ICD-10-CM

## 2025-04-03 DIAGNOSIS — R05.3 PERSISTENT COUGH FOR 3 WEEKS OR LONGER: Primary | ICD-10-CM

## 2025-04-03 LAB
QT INTERVAL: 380 MS
QTC INTERVAL: 459 MS
TESTOST FREE SERPL-MCNC: 3.9 PG/ML (ref 0–4.2)
TESTOST SERPL-MCNC: 68.1 NG/DL

## 2025-04-03 PROCEDURE — 1126F AMNT PAIN NOTED NONE PRSNT: CPT | Performed by: FAMILY MEDICINE

## 2025-04-03 PROCEDURE — 99213 OFFICE O/P EST LOW 20 MIN: CPT | Performed by: FAMILY MEDICINE

## 2025-04-03 RX ORDER — METHYLPREDNISOLONE 4 MG/1
TABLET ORAL
Qty: 21 EACH | Refills: 0 | Status: SHIPPED | OUTPATIENT
Start: 2025-04-03

## 2025-04-03 RX ORDER — FLUCONAZOLE 150 MG/1
150 TABLET ORAL ONCE
Qty: 1 TABLET | Refills: 0 | OUTPATIENT
Start: 2025-04-03 | End: 2025-04-03

## 2025-04-03 NOTE — PROGRESS NOTES
Established Patient Virtual Visit      Patient Name: Opal Michaels  : 1975   MRN: [id number]   Care Team: Patient Care Team:  Don Daley DO as PCP - General (Family Medicine)    Chief Complaint:    Chief Complaint   Patient presents with    Follow-up     Flank/side rib pain       History of Present Illness: Opal Michaels is a 50 y.o. female who is here today for chief complaint.    HPI    Pain is still present, no change even after the ER. Almost finished with amoxicillin.    You have chosen to receive care through a telehealth visit.  Do you consent to use a video/audio connection for your medical care today? Yes    Patient location: [address] [id number]   Provider Location: [address] [place] [address]    The following portions of the patient's history were reviewed and updated as appropriate: allergies, current medications, past family history, past medical history, past social history, past surgical history and problem list.    Subjective      Review of Systems:   Review of Systems - See HPI  Review of Systems -  General ROS: negative for - chills, fever or night sweats  Cardiovascular ROS: no chest pain or dyspnea on exertion  Gastrointestinal ROS: no abdominal pain, change in bowel habits, or black or bloody stools  Genito-Urinary ROS: no dysuria, trouble voiding, or hematuria  Past Medical History:   Past Medical History:   Diagnosis Date    Depression     Endometriosis     Fibromyalgia, primary     Gall stones     History of stomach ulcers     Kidney stone     PCOS (polycystic ovarian syndrome)        Past Surgical History:   Past Surgical History:   Procedure Laterality Date    CHOLECYSTECTOMY      HYSTERECTOMY      TUBAL ABDOMINAL LIGATION         Family History:   Family History   Problem Relation Age of Onset    Hyperlipidemia Mother     Hypertension Mother     Hepatitis Mother         FAITH    Hyperlipidemia Father     Hypertension Father      No Known Problems Sister     No Known Problems Brother     Diabetes Maternal Grandmother     Diabetes Maternal Grandfather     Stroke Paternal Grandmother     Cancer Paternal Grandfather        Social History:   Social History     Socioeconomic History    Marital status:    Tobacco Use    Smoking status: Former     Current packs/day: 0.00     Average packs/day: 1 pack/day for 25.3 years (25.3 ttl pk-yrs)     Types: Cigarettes     Start date: 1995     Quit date: 2020     Years since quittin.9    Smokeless tobacco: Never   Vaping Use    Vaping status: Never Used   Substance and Sexual Activity    Alcohol use: No    Drug use: No    Sexual activity: Defer       Tobacco History:   Social History     Tobacco Use   Smoking Status Former    Current packs/day: 0.00    Average packs/day: 1 pack/day for 25.3 years (25.3 ttl pk-yrs)    Types: Cigarettes    Start date: 1995    Quit date: 2020    Years since quittin.9   Smokeless Tobacco Never       Medications:   Outpatient Medications Prior to Visit   Medication Sig Dispense Refill    Kristi 0.05 MG/24HR patch APPLY 1 PATCH TOPICALLY TWICE A WEEK      ibuprofen (ADVIL,MOTRIN) 600 MG tablet Take 1 tablet by mouth Every 6 (Six) Hours As Needed for Mild Pain.      nicotine (NICODERM CQ) 21 MG/24HR patch Place 1 patch on the skin as directed by provider Daily. 30 each 0    omeprazole (priLOSEC) 40 MG capsule Take 1 capsule by mouth Daily. 30 capsule 0    Progesterone (PROMETRIUM) 100 MG capsule Take 1 capsule by mouth Daily.       No facility-administered medications prior to visit.        Allergies:   No Known Allergies    Objective   Objective     Physical Exam:  Vital Signs:  There were no vitals taken for this visit.  Vitals obtained from patient if available  Physical Exam  Const: Non-toxic appearing, NAD, A&Ox4, Pleasant, Cooperative  Eyes: EOMI, no conjunctivitis  ENT: No copious nasal drainage noted  Cardiac: Regular rate by pulse  Resp:  Respiratory rate observed to be within normal limits, no increased work of breathing observed, no audible wheezing or cough noted  Psych: Appropriate mood and behavior.  Assessment & Plan   Assessment / Plan      Assessment/Plan:   Problems Addressed This Visit  Problem List Items Addressed This Visit    None  Visit Diagnoses         Persistent cough for 3 weeks or longer    -  Primary    Relevant Medications    methylPREDNISolone (MEDROL) 4 MG dose pack    Other Relevant Orders    CT Chest Without Contrast      Atelectasis of both lungs        Relevant Medications    methylPREDNISolone (MEDROL) 4 MG dose pack    Other Relevant Orders    CT Chest Without Contrast      Soft tissue swelling of chest wall        Relevant Orders    US Chest          Pain and swelling right flank  -nodule with pain for 2 months  Will check US soft tissue, nothing n CT    Bibasilar atelectasis  Reorder CT chest    Pain: Elevated CRP, ESR. No change with amox, NSAIDs. Will send steroid    See patient diagnoses and orders along with patient instructions for assessment, plan, and changes to care for patient.    This visit was conducted via telemedicine with live video and audio provided through [employer] at the point of care.    There are no Patient Instructions on file for this visit.    No follow-ups on file.    DO CLAUDIO Smith RD  [facility]  [address]  [facility] [address]  Fax [phone]  Phone [phone]    Disclaimer to patients: The 21st Century Cares Act makes medical notes like these available to patients in the interest of transparency. However, please be advised that this is still a medical document. It is intended as [facility] communication. Many sections may include medical language or jargon, abbreviations, and additional verbiage that are unfamiliar or confusing. In some ways it may come across as blunt, direct, or may be summarized in order to  clearly and concisely communicate the most crucial information to medical professionals. It may also include mentions of conditions that are unlikely but considered as part of the differential diagnosis, including serious disorders. These are not always discussed at length at the time of appointment because their likelihood is so low, but may be included in a medical note to make it clear what has been considered and/or ruled out as part of a work-up. Medical documents are intended to carry relevant information, facts as evident, and the personal clinical opinion of the physician. If you have any questions regarding this medical document, please bring them to the attention of the physician at your next scheduled appointment.

## 2025-04-04 ENCOUNTER — TELEPHONE (OUTPATIENT)
Dept: FAMILY MEDICINE CLINIC | Facility: CLINIC | Age: 50
End: 2025-04-04

## 2025-04-04 NOTE — TELEPHONE ENCOUNTER
Caller: Opal Michaels    Relationship: Self    Best call back number:       [phone] (Home)     What orders are you requesting (i.e. lab or imaging):     PATIENT STATED FOLLOWING APPOINTMENT WITH DR GALINDO 4/3, A CT SCAN OF HER CHEST AND ULTRASOUND OF PATIENT'S RIGHT FLANK SIDE PAIN TO EXAMINE SOFT TISSUE WAS TO BE ORDERED    Where will you receive your lab/imaging services:     AS RECOMMENDED BY DR GALINDO    PLEASE CONTACT PATIENT WITH UPDATES FOR THESE TESTS

## 2025-04-04 NOTE — TELEPHONE ENCOUNTER
Caller: Opal Michaels    Relationship: Self    Best call back number:       [phone] (Home)     What medication are you requesting:     DIFLUCAN    What are your current symptoms:     [facility], A PRESCRIPTION FOR MEDICATION WAS TO BE FORWARDED TO PHARMACY    PATIENT ALSO STATED PHARMACY HAS NOT RECEIVED PRESCRIPTION YET    If a prescription is needed, what is your preferred pharmacy and phone number:     [facility] - [place], [place]    TELEPHONE CONTACT:    [phone]

## 2025-04-09 LAB
QT INTERVAL: 380 MS
QTC INTERVAL: 459 MS

## 2025-04-16 ENCOUNTER — HOSPITAL ENCOUNTER (OUTPATIENT)
Dept: CT IMAGING | Facility: HOSPITAL | Age: 50
Discharge: HOME | End: 2025-04-16
Admitting: FAMILY MEDICINE
Payer: MEDICAID

## 2025-04-16 DIAGNOSIS — R05.3 PERSISTENT COUGH FOR 3 WEEKS OR LONGER: ICD-10-CM

## 2025-04-16 DIAGNOSIS — J98.11 ATELECTASIS OF BOTH LUNGS: ICD-10-CM

## 2025-04-16 PROCEDURE — 71250 CT THORAX DX C-: CPT

## 2025-04-21 ENCOUNTER — HOSPITAL ENCOUNTER (OUTPATIENT)
Dept: ULTRASOUND IMAGING | Facility: HOSPITAL | Age: 50
Discharge: HOME | End: 2025-04-21
Admitting: FAMILY MEDICINE
Payer: MEDICAID

## 2025-04-21 DIAGNOSIS — R22.2 SOFT TISSUE SWELLING OF CHEST WALL: ICD-10-CM

## 2025-04-21 PROCEDURE — 76604 US EXAM CHEST: CPT

## 2025-07-21 ENCOUNTER — TELEMEDICINE (OUTPATIENT)
Dept: FAMILY MEDICINE CLINIC | Facility: CLINIC | Age: 50
End: 2025-07-21
Payer: MEDICAID

## 2025-07-21 DIAGNOSIS — Z13.89 SCREENING FOR BLOOD OR PROTEIN IN URINE: ICD-10-CM

## 2025-07-21 DIAGNOSIS — E55.9 VITAMIN D DEFICIENCY: ICD-10-CM

## 2025-07-21 DIAGNOSIS — M54.2 NECK PAIN: ICD-10-CM

## 2025-07-21 DIAGNOSIS — M51.360 DEGENERATION OF INTERVERTEBRAL DISC OF LUMBAR REGION WITH DISCOGENIC BACK PAIN: ICD-10-CM

## 2025-07-21 DIAGNOSIS — Z13.220 SCREENING FOR HYPERLIPIDEMIA: ICD-10-CM

## 2025-07-21 DIAGNOSIS — M41.34 THORACOGENIC SCOLIOSIS OF THORACIC REGION: ICD-10-CM

## 2025-07-21 DIAGNOSIS — Z13.29 SCREENING FOR ENDOCRINE DISORDER: ICD-10-CM

## 2025-07-21 DIAGNOSIS — Z00.00 PREVENTATIVE HEALTH CARE: ICD-10-CM

## 2025-07-21 DIAGNOSIS — J43.1 PANLOBULAR EMPHYSEMA: Primary | ICD-10-CM

## 2025-07-21 DIAGNOSIS — R79.82 ELEVATED C-REACTIVE PROTEIN (CRP): ICD-10-CM

## 2025-07-21 DIAGNOSIS — Z13.0 SCREENING FOR DEFICIENCY ANEMIA: ICD-10-CM

## 2025-07-21 DIAGNOSIS — R79.89 HIGH SERUM VITAMIN B12: ICD-10-CM

## 2025-07-21 DIAGNOSIS — R76.8 POSITIVE ANA (ANTINUCLEAR ANTIBODY): ICD-10-CM

## 2025-07-21 DIAGNOSIS — E56.9 VITAMIN DEFICIENCY: ICD-10-CM

## 2025-07-21 DIAGNOSIS — R53.83 LETHARGY: ICD-10-CM

## 2025-07-21 RX ORDER — MELOXICAM 7.5 MG/1
7.5 TABLET ORAL DAILY
Qty: 30 TABLET | Refills: 5 | Status: SHIPPED | OUTPATIENT
Start: 2025-07-21

## 2025-07-21 RX ORDER — MOMETASONE FUROATE AND FORMOTEROL FUMARATE DIHYDRATE 50; 5 UG/1; UG/1
2 AEROSOL RESPIRATORY (INHALATION)
Qty: 13 G | Refills: 11 | Status: SHIPPED | OUTPATIENT
Start: 2025-07-21

## 2025-07-21 RX ORDER — ALBUTEROL SULFATE 90 UG/1
2 INHALANT RESPIRATORY (INHALATION) 2 TIMES DAILY
Qty: 8 G | Refills: 1 | Status: SHIPPED | OUTPATIENT
Start: 2025-07-21

## 2025-07-21 RX ORDER — FEXOFENADINE HCL 180 MG/1
180 TABLET ORAL DAILY
Qty: 90 TABLET | Refills: 0 | Status: SHIPPED | OUTPATIENT
Start: 2025-07-21

## 2025-07-22 ENCOUNTER — TELEPHONE (OUTPATIENT)
Dept: FAMILY MEDICINE CLINIC | Facility: CLINIC | Age: 50
End: 2025-07-22

## 2025-07-22 ENCOUNTER — TELEPHONE (OUTPATIENT)
Dept: FAMILY MEDICINE CLINIC | Facility: CLINIC | Age: 50
End: 2025-07-22
Payer: MEDICAID

## 2025-07-22 NOTE — TELEPHONE ENCOUNTER
Caller: Opal Michaels    Relationship: Self    Best call back number: [phone]     What orders are you requesting (i.e. lab or imaging): LABS    In what timeframe would the patient need to come in: ASAP TODAY      Additional notes: PATIENT STATED LAST VISIT PCP WAS TO PUT IN LABS, PT REFERRAL AND XRAY REFERRAL. LABS ARE NOT SHOWING. PLEASE PUT IN CHART SO PATIENT CAN COME GET XRAYS AND LABS

## 2025-07-23 NOTE — PROGRESS NOTES
Established Patient Virtual Visit      Patient Name: Opal Michaels  : 1975   MRN: [id number]   Care Team: Patient Care Team:  Don Daley DO as PCP - General (Family Medicine)  Teresita Tang APRN as Nurse Practitioner (Obstetrics and Gynecology)    Chief Complaint:  No chief complaint on file.      History of Present Illness: Opal Michaels is a 50 y.o. female who is here today for chief complaint.    HPI    History of Present Illness  The patient presents today to follow up on testing.    She continues to experience right flank pain and has noticed a mass in the same area. An ultrasound indicated that the mass is likely a benign lipoma. Additionally, she reports severe pain extending from the top of her left spine to the lower back, which intensifies with each breath. This pain is not localized near her neck but rather along the left side of her spine. She experiences random bone pain throughout her body, including her calves and ankles, even though she has not sustained any injuries. The pain is transient, lasting for 3 to 4 minutes, and occurs daily. She has discontinued progesterone due to significant weight gain, having gained 60 pounds since starting hormone replacement therapy (HRT). She has been diagnosed with fibromyalgia in the past and is unsure if these symptoms are indicative of a worsening condition. A steroid pack provided some relief for her cough and pain, but she believes it may have contributed to her weight gain. She takes anti-inflammatory medications like Advil or Aleve when the pain becomes unbearable, but she is unsure of their effectiveness. She has difficulty sleeping and has tried using a specific mattress without success. She woke up this morning with pain on the left side of her spine and at the bottom of her back.    She has been smoking for a long time and has not quit yet. Imaging from April showed early COPD changes but no malignancy or suspicious lesions. She  does not take any allergy medication for her COPD. She has been inactive for the past 3 years, despite previously being very active with running and walking.    She had her mammogram done and it was normal.    Social History:  Tobacco: She smokes cigarettes.  Sleep: She reports difficulty sleeping and has tried using a specific mattress without success.      You have chosen to receive care through a telehealth visit.  Do you consent to use a video/audio connection for your medical care today? Yes    Patient location: [address] [facility] [address]   [place] [address] [id number]    The following portions of the patient's history were reviewed and updated as appropriate: allergies, current medications, past family history, past medical history, past social history, past surgical history and problem list.    Subjective      Review of Systems:   Review of Systems - See HPI  Review of Systems -  General ROS: negative for - chills, fever or night sweats  Cardiovascular ROS: no chest pain or dyspnea on exertion  Gastrointestinal ROS: no abdominal pain, change in bowel habits, or black or bloody stools  Genito-Urinary ROS: no dysuria, trouble voiding, or hematuria  Past Medical History:   Past Medical History:   Diagnosis Date    Depression     Endometriosis     Fibromyalgia, primary     Gall stones     History of stomach ulcers     Kidney stone     PCOS (polycystic ovarian syndrome)        Past Surgical History:   Past Surgical History:   Procedure Laterality Date    CHOLECYSTECTOMY      HYSTERECTOMY      TUBAL ABDOMINAL LIGATION         Family History:   Family History   Problem Relation Age of Onset    Hyperlipidemia Mother     Hypertension Mother     Hepatitis Mother         FAITH    Hyperlipidemia Father     Hypertension Father     No Known Problems Sister     No Known Problems Brother     Diabetes Maternal Grandmother     Diabetes Maternal Grandfather     Stroke Paternal  Grandmother     Cancer Paternal Grandfather        Social History:   Social History     Socioeconomic History    Marital status:    Tobacco Use    Smoking status: Former     Current packs/day: 1.00     Average packs/day: 1 pack/day for 30.6 years (30.6 ttl pk-yrs)     Types: Cigarettes     Start date: 1/1/1995    Smokeless tobacco: Never   Vaping Use    Vaping status: Never Used   Substance and Sexual Activity    Alcohol use: No    Drug use: No    Sexual activity: Defer       Tobacco History:   Social History     Tobacco Use   Smoking Status Former    Current packs/day: 1.00    Average packs/day: 1 pack/day for 30.6 years (30.6 ttl pk-yrs)    Types: Cigarettes    Start date: 1/1/1995   Smokeless Tobacco Never       Medications:   Outpatient Medications Prior to Visit   Medication Sig Dispense Refill    Kristi 0.05 MG/24HR patch APPLY 1 PATCH TOPICALLY TWICE A WEEK      ibuprofen (ADVIL,MOTRIN) 600 MG tablet Take 1 tablet by mouth Every 6 (Six) Hours As Needed for Mild Pain.      nicotine (NICODERM CQ) 21 MG/24HR patch Place 1 patch on the skin as directed by provider Daily. 30 each 0    omeprazole (priLOSEC) 40 MG capsule Take 1 capsule by mouth Daily. 30 capsule 0    Progesterone (PROMETRIUM) 100 MG capsule Take 1 capsule by mouth Daily.      methylPREDNISolone (MEDROL) 4 MG dose pack Take as directed on package instructions. 21 each 0     No facility-administered medications prior to visit.        Allergies:   No Known Allergies    Objective   Objective     Physical Exam:  Vital Signs:  There were no vitals taken for this visit.  Vitals obtained from patient if available  Physical Exam  Const: Non-toxic appearing, NAD, A&Ox4, Pleasant, Cooperative  Eyes: EOMI, no conjunctivitis  ENT: No copious nasal drainage noted  Cardiac: Regular rate by pulse  Resp: Respiratory rate observed to be within normal limits, no increased work of breathing observed, no audible wheezing or cough noted  Psych: Appropriate mood  and behavior.  Assessment & Plan   Assessment / Plan      Assessment/Plan:   Problems Addressed This Visit  Problem List Items Addressed This Visit    None  Visit Diagnoses         Panlobular emphysema    -  Primary    Relevant Medications    mometasone-formoterol (Dulera) 50-5 MCG/ACT inhaler    albuterol sulfate  (90 Base) MCG/ACT inhaler    fexofenadine (Allegra Allergy) 180 MG tablet    Other Relevant Orders    Complete PFT      Degeneration of intervertebral disc of lumbar region with discogenic back pain        Relevant Medications    meloxicam (Mobic) 7.5 MG tablet    Other Relevant Orders    XR Spine Survey AP & Lateral    Ambulatory Referral to Physical Therapy for Evaluation & Treatment      Neck pain        Relevant Medications    meloxicam (Mobic) 7.5 MG tablet    Other Relevant Orders    XR Spine Survey AP & Lateral    Ambulatory Referral to Physical Therapy for Evaluation & Treatment      Thoracogenic scoliosis of thoracic region        Relevant Medications    meloxicam (Mobic) 7.5 MG tablet    Other Relevant Orders    XR Spine Survey AP & Lateral    Ambulatory Referral to Physical Therapy for Evaluation & Treatment      Screening for hyperlipidemia        Relevant Orders    Lipid Panel      Preventative health care        Relevant Orders    Lipid Panel    Hemoglobin A1c    Comprehensive Metabolic Panel    CBC & Differential    Urinalysis With Microscopic If Indicated (No Culture) - Urine, Clean Catch    TSH Rfx On Abnormal To Free T4    Vitamin B12    Vitamin D,25-Hydroxy      Screening for endocrine disorder        Relevant Orders    Hemoglobin A1c    Comprehensive Metabolic Panel    TSH Rfx On Abnormal To Free T4      Screening for deficiency anemia        Relevant Orders    CBC & Differential      Screening for blood or protein in urine        Relevant Orders    Urinalysis With Microscopic If Indicated (No Culture) - Urine, Clean Catch      Vitamin deficiency        Relevant Orders     Vitamin B12      Vitamin D deficiency        Relevant Orders    Vitamin D,25-Hydroxy      High serum vitamin B12          Elevated C-reactive protein (CRP)          Lethargy        Relevant Orders    Vitamin B12    Vitamin B6    Magnesium    Phosphorus    Vitamin B1, Whole Blood    Ferritin    Iron Profile w/o Ferritin    Folate      Positive FAMILIA (antinuclear antibody)        Relevant Orders    C-reactive Protein    FAMILIA by IFA, Reflex 9-biomarkers profile    Uric Acid          Assessment & Plan  1. Chronic Obstructive Pulmonary Disease (COPD).  - Imaging from 04/2025 showed no malignancy, masses, or lesions, but did show some early COPD changes likely due to smoking.  - Advised to quit smoking to reduce the risk of lung cancer and improve lung function.  - An albuterol inhaler will be prescribed to manage her cough and shortness of breath. Formal lung function testing will be ordered.  - Advised to monitor for potential allergens in her environment that could exacerbate her symptoms. An allergy pill will be prescribed to help with breathing.    2. Right Flank Pain.  - Ultrasound indicated a benign lipoma as the likely cause of her right flank pain.  - No further action is required unless new symptoms arise.  - All lymph nodes were fine on examination.  - Advised to monitor the area for any changes.    3. Fibromyalgia.  - Bone pain could be attributed to fibromyalgia or a nerve-related issue.  - Labs will be rechecked in a few months to monitor any changes.  - Pain is described as random and migratory, lasting about 3 to 4 minutes.  - Discussed that fibromyalgia might be worsening.    4. Left-sided Upper Spine Pain.  - Pain could be due to arthritis or scoliosis.  - A once-daily anti-inflammatory medication will be prescribed to manage her pain.  - An x-ray of her entire spine will be ordered, and a referral to physical therapy will be made.  - Discussed the possibility of arthritis in the lumbar spine and its  contribution to her pain.    Follow-up: A follow-up appointment will be scheduled in approximately one month.    Patient or patient representative verbalized consent for the use of Ambient Listening during the visit with  Don Daley DO for chart documentation. 7/23/2025 12:18 EDT     See patient diagnoses and orders along with patient instructions for assessment, plan, and changes to care for patient.    This visit was conducted via telemedicine with live video and audio provided through [employer] at the point of care.    There are no Patient Instructions on file for this visit.    Return in about 1 month (around 8/21/2025) for video visit, follow up after testing/imaging.    Don Daley DO  [facility]  [facility]  [facility]  [address]  [facility] [address]  Fax [phone]  Phone [phone]    Disclaimer to patients: The 21[employer] Century C[employer] Act makes medical notes like these available to patients in the interest of transparency. However, please be advised that this is still a medical document. It is intended as [id number] communication. Many sections may include medical language or jargon, abbreviations, and additional verbiage that are unfamiliar or confusing. In some ways it may come across as blunt, direct, or may be summarized in order to clearly and concisely communicate the most crucial information to medical professionals. It may also include mentions of conditions that are unlikely but considered as part of the differential diagnosis, including serious disorders. These are not always discussed at length at the time of appointment because their likelihood is so low, but may be included in a medical note to make it clear what has been considered and/or ruled out as part of a work-up. Medical documents are intended to carry relevant information, facts as evident, and the personal clinical opinion of the physician. If you have any questions  regarding this medical document, please bring them to the attention of the physician at your next scheduled appointment.

## 2025-08-11 ENCOUNTER — DOCUMENTATION (OUTPATIENT)
Dept: FAMILY MEDICINE CLINIC | Facility: CLINIC | Age: 50
End: 2025-08-11
Payer: MEDICAID

## 2025-08-11 ENCOUNTER — TELEPHONE (OUTPATIENT)
Dept: FAMILY MEDICINE CLINIC | Facility: CLINIC | Age: 50
End: 2025-08-11
Payer: MEDICAID

## 2025-08-11 ENCOUNTER — HOSPITAL ENCOUNTER (OUTPATIENT)
Dept: PULMONOLOGY | Facility: HOSPITAL | Age: 50
Discharge: HOME | End: 2025-08-11
Payer: MEDICAID

## 2025-08-11 ENCOUNTER — LAB (OUTPATIENT)
Dept: LAB | Facility: HOSPITAL | Age: 50
End: 2025-08-11
Payer: MEDICAID

## 2025-08-11 ENCOUNTER — HOSPITAL ENCOUNTER (OUTPATIENT)
Dept: GENERAL RADIOLOGY | Facility: HOSPITAL | Age: 50
Discharge: HOME | End: 2025-08-11
Payer: MEDICAID

## 2025-08-11 DIAGNOSIS — Z00.00 PREVENTATIVE HEALTH CARE: ICD-10-CM

## 2025-08-11 DIAGNOSIS — Z13.0 SCREENING FOR DEFICIENCY ANEMIA: ICD-10-CM

## 2025-08-11 DIAGNOSIS — E56.9 VITAMIN DEFICIENCY: ICD-10-CM

## 2025-08-11 DIAGNOSIS — E55.9 VITAMIN D DEFICIENCY: ICD-10-CM

## 2025-08-11 DIAGNOSIS — Z13.29 SCREENING FOR ENDOCRINE DISORDER: ICD-10-CM

## 2025-08-11 DIAGNOSIS — M41.34 THORACOGENIC SCOLIOSIS OF THORACIC REGION: ICD-10-CM

## 2025-08-11 DIAGNOSIS — R76.8 POSITIVE ANA (ANTINUCLEAR ANTIBODY): ICD-10-CM

## 2025-08-11 DIAGNOSIS — Z13.89 SCREENING FOR BLOOD OR PROTEIN IN URINE: ICD-10-CM

## 2025-08-11 DIAGNOSIS — R53.83 LETHARGY: ICD-10-CM

## 2025-08-11 DIAGNOSIS — J43.1 PANLOBULAR EMPHYSEMA: ICD-10-CM

## 2025-08-11 DIAGNOSIS — Z13.220 SCREENING FOR HYPERLIPIDEMIA: ICD-10-CM

## 2025-08-11 DIAGNOSIS — M51.360 DEGENERATION OF INTERVERTEBRAL DISC OF LUMBAR REGION WITH DISCOGENIC BACK PAIN: Primary | ICD-10-CM

## 2025-08-11 LAB
25(OH)D3 SERPL-MCNC: 27.1 NG/ML (ref 30–100)
ALBUMIN SERPL-MCNC: 4.1 G/DL (ref 3.5–5.2)
ALBUMIN/GLOB SERPL: 1.5 G/DL
ALP SERPL-CCNC: 100 U/L (ref 39–117)
ALT SERPL W P-5'-P-CCNC: 10 U/L (ref 1–33)
ANION GAP SERPL CALCULATED.3IONS-SCNC: 11 MMOL/L (ref 5–15)
AST SERPL-CCNC: 15 U/L (ref 1–32)
BACTERIA UR QL AUTO: ABNORMAL /HPF
BASOPHILS # BLD AUTO: 0.06 10*3/MM3 (ref 0–0.2)
BASOPHILS NFR BLD AUTO: 0.6 % (ref 0–1.5)
BILIRUB SERPL-MCNC: <0.2 MG/DL (ref 0–1.2)
BILIRUB UR QL STRIP: NEGATIVE
BUN SERPL-MCNC: 21 MG/DL (ref 6–20)
BUN/CREAT SERPL: 22.8 (ref 7–25)
CALCIUM SPEC-SCNC: 9.2 MG/DL (ref 8.6–10.5)
CHLORIDE SERPL-SCNC: 106 MMOL/L (ref 98–107)
CHOLEST SERPL-MCNC: 184 MG/DL (ref 0–200)
CLARITY UR: ABNORMAL
CO2 SERPL-SCNC: 24 MMOL/L (ref 22–29)
COLOR UR: YELLOW
CREAT SERPL-MCNC: 0.92 MG/DL (ref 0.57–1)
CRP SERPL-MCNC: 0.58 MG/DL (ref 0–0.5)
DEPRECATED RDW RBC AUTO: 40.4 FL (ref 37–54)
EGFRCR SERPLBLD CKD-EPI 2021: 77 ML/MIN/1.73
EOSINOPHIL # BLD AUTO: 0.41 10*3/MM3 (ref 0–0.4)
EOSINOPHIL NFR BLD AUTO: 4.1 % (ref 0.3–6.2)
ERYTHROCYTE [DISTWIDTH] IN BLOOD BY AUTOMATED COUNT: 13.2 % (ref 12.3–15.4)
FERRITIN SERPL-MCNC: 13.5 NG/ML (ref 13–150)
FOLATE SERPL-MCNC: 5.62 NG/ML (ref 4.78–24.2)
GLOBULIN UR ELPH-MCNC: 2.8 GM/DL
GLUCOSE SERPL-MCNC: 80 MG/DL (ref 65–99)
GLUCOSE UR STRIP-MCNC: NEGATIVE MG/DL
HBA1C MFR BLD: 5.2 % (ref 4.8–5.6)
HCT VFR BLD AUTO: 32.8 % (ref 34–46.6)
HDLC SERPL-MCNC: 43 MG/DL (ref 40–60)
HGB BLD-MCNC: 10.7 G/DL (ref 12–15.9)
HGB UR QL STRIP.AUTO: NEGATIVE
HYALINE CASTS UR QL AUTO: ABNORMAL /LPF
IMM GRANULOCYTES # BLD AUTO: 0.03 10*3/MM3 (ref 0–0.05)
IMM GRANULOCYTES NFR BLD AUTO: 0.3 % (ref 0–0.5)
IRON 24H UR-MRATE: 40 MCG/DL (ref 37–145)
IRON SATN MFR SERPL: 9 % (ref 20–50)
KETONES UR QL STRIP: NEGATIVE
LDLC SERPL CALC-MCNC: 112 MG/DL (ref 0–100)
LDLC/HDLC SERPL: 2.52 {RATIO}
LEUKOCYTE ESTERASE UR QL STRIP.AUTO: NEGATIVE
LYMPHOCYTES # BLD AUTO: 3.36 10*3/MM3 (ref 0.7–3.1)
LYMPHOCYTES NFR BLD AUTO: 33.4 % (ref 19.6–45.3)
MAGNESIUM SERPL-MCNC: 2.1 MG/DL (ref 1.6–2.6)
MCH RBC QN AUTO: 27.6 PG (ref 26.6–33)
MCHC RBC AUTO-ENTMCNC: 32.6 G/DL (ref 31.5–35.7)
MCV RBC AUTO: 84.8 FL (ref 79–97)
MONOCYTES # BLD AUTO: 0.6 10*3/MM3 (ref 0.1–0.9)
MONOCYTES NFR BLD AUTO: 6 % (ref 5–12)
NEUTROPHILS NFR BLD AUTO: 5.61 10*3/MM3 (ref 1.7–7)
NEUTROPHILS NFR BLD AUTO: 55.6 % (ref 42.7–76)
NITRITE UR QL STRIP: NEGATIVE
NRBC BLD AUTO-RTO: 0 /100 WBC (ref 0–0.2)
PH UR STRIP.AUTO: 7 [PH] (ref 5–8)
PHOSPHATE SERPL-MCNC: 2.8 MG/DL (ref 2.5–4.5)
PLATELET # BLD AUTO: 362 10*3/MM3 (ref 140–450)
PMV BLD AUTO: 11.1 FL (ref 6–12)
POTASSIUM SERPL-SCNC: 4.2 MMOL/L (ref 3.5–5.2)
PROT SERPL-MCNC: 6.9 G/DL (ref 6–8.5)
PROT UR QL STRIP: ABNORMAL
RBC # BLD AUTO: 3.87 10*6/MM3 (ref 3.77–5.28)
RBC # UR STRIP: ABNORMAL /HPF
REF LAB TEST METHOD: ABNORMAL
SODIUM SERPL-SCNC: 141 MMOL/L (ref 136–145)
SP GR UR STRIP: 1.02 (ref 1–1.03)
SQUAMOUS #/AREA URNS HPF: ABNORMAL /HPF
TIBC SERPL-MCNC: 466 MCG/DL (ref 298–536)
TRANSFERRIN SERPL-MCNC: 313 MG/DL (ref 200–360)
TRIGL SERPL-MCNC: 163 MG/DL (ref 0–150)
TSH SERPL DL<=0.05 MIU/L-ACNC: 2.14 UIU/ML (ref 0.27–4.2)
URATE SERPL-MCNC: 7 MG/DL (ref 2.4–5.7)
UROBILINOGEN UR QL STRIP: ABNORMAL
VIT B12 BLD-MCNC: 615 PG/ML (ref 211–946)
VLDLC SERPL-MCNC: 29 MG/DL (ref 5–40)
WBC # UR STRIP: ABNORMAL /HPF
WBC NRBC COR # BLD AUTO: 10.07 10*3/MM3 (ref 3.4–10.8)

## 2025-08-11 PROCEDURE — 86235 NUCLEAR ANTIGEN ANTIBODY: CPT

## 2025-08-11 PROCEDURE — 82607 VITAMIN B-12: CPT

## 2025-08-11 PROCEDURE — 84207 ASSAY OF VITAMIN B-6: CPT

## 2025-08-11 PROCEDURE — 81001 URINALYSIS AUTO W/SCOPE: CPT

## 2025-08-11 PROCEDURE — 86225 DNA ANTIBODY NATIVE: CPT

## 2025-08-11 PROCEDURE — 80061 LIPID PANEL: CPT

## 2025-08-11 PROCEDURE — 84425 ASSAY OF VITAMIN B-1: CPT

## 2025-08-11 PROCEDURE — 84550 ASSAY OF BLOOD/URIC ACID: CPT

## 2025-08-11 PROCEDURE — 83540 ASSAY OF IRON: CPT

## 2025-08-11 PROCEDURE — 86140 C-REACTIVE PROTEIN: CPT

## 2025-08-11 PROCEDURE — 85025 COMPLETE CBC W/AUTO DIFF WBC: CPT

## 2025-08-11 PROCEDURE — 94010 BREATHING CAPACITY TEST: CPT

## 2025-08-11 PROCEDURE — 86038 ANTINUCLEAR ANTIBODIES: CPT

## 2025-08-11 PROCEDURE — 94729 DIFFUSING CAPACITY: CPT

## 2025-08-11 PROCEDURE — 80053 COMPREHEN METABOLIC PANEL: CPT

## 2025-08-11 PROCEDURE — 84100 ASSAY OF PHOSPHORUS: CPT

## 2025-08-11 PROCEDURE — 82746 ASSAY OF FOLIC ACID SERUM: CPT

## 2025-08-11 PROCEDURE — 94726 PLETHYSMOGRAPHY LUNG VOLUMES: CPT

## 2025-08-11 PROCEDURE — 82728 ASSAY OF FERRITIN: CPT

## 2025-08-11 PROCEDURE — 83036 HEMOGLOBIN GLYCOSYLATED A1C: CPT

## 2025-08-11 PROCEDURE — 84443 ASSAY THYROID STIM HORMONE: CPT

## 2025-08-11 PROCEDURE — 84466 ASSAY OF TRANSFERRIN: CPT

## 2025-08-11 PROCEDURE — 82306 VITAMIN D 25 HYDROXY: CPT

## 2025-08-11 PROCEDURE — 83735 ASSAY OF MAGNESIUM: CPT

## 2025-08-13 LAB
ANA SER QL IF: POSITIVE
ANA SPECKLED TITR SER: NORMAL {TITER}
CENTROMERE B AB SER-ACNC: <0.2 AI (ref 0–0.9)
CHROMATIN AB SERPL-ACNC: <0.2 AI (ref 0–0.9)
DSDNA AB SER-ACNC: 2 IU/ML (ref 0–9)
ENA JO1 AB SER-ACNC: <0.2 AI (ref 0–0.9)
ENA RNP AB SER-ACNC: <0.2 AI (ref 0–0.9)
ENA SCL70 AB SER-ACNC: 0.4 AI (ref 0–0.9)
ENA SM AB SER-ACNC: <0.2 AI (ref 0–0.9)
ENA SS-A AB SER-ACNC: <0.2 AI (ref 0–0.9)
ENA SS-B AB SER-ACNC: <0.2 AI (ref 0–0.9)
LABORATORY COMMENT REPORT: ABNORMAL
Lab: NORMAL
Lab: NORMAL

## 2025-08-14 ENCOUNTER — HOSPITAL ENCOUNTER (OUTPATIENT)
Dept: GENERAL RADIOLOGY | Facility: HOSPITAL | Age: 50
Discharge: HOME | End: 2025-08-14
Payer: MEDICAID

## 2025-08-14 DIAGNOSIS — M41.34 THORACOGENIC SCOLIOSIS OF THORACIC REGION: ICD-10-CM

## 2025-08-14 DIAGNOSIS — M51.360 DEGENERATION OF INTERVERTEBRAL DISC OF LUMBAR REGION WITH DISCOGENIC BACK PAIN: ICD-10-CM

## 2025-08-14 LAB — PYRIDOXAL PHOS SERPL-MCNC: 5.7 UG/L (ref 3.4–65.2)

## 2025-08-14 PROCEDURE — 72070 X-RAY EXAM THORAC SPINE 2VWS: CPT

## 2025-08-14 PROCEDURE — 72100 X-RAY EXAM L-S SPINE 2/3 VWS: CPT

## 2025-08-17 LAB — VIT B1 BLD-SCNC: 82.7 NMOL/L (ref 66.5–200)

## 2025-08-28 ENCOUNTER — TELEPHONE (OUTPATIENT)
Dept: FAMILY MEDICINE CLINIC | Facility: CLINIC | Age: 50
End: 2025-08-28

## 2025-08-29 RX ORDER — IBUPROFEN 600 MG/1
600 TABLET, FILM COATED ORAL EVERY 6 HOURS PRN
Qty: 90 TABLET | Refills: 0 | Status: SHIPPED | OUTPATIENT
Start: 2025-08-29

## 2025-08-29 RX ORDER — SENNOSIDES 8.6 MG
650 CAPSULE ORAL EVERY 8 HOURS PRN
Qty: 30 TABLET | Refills: 0 | Status: SHIPPED | OUTPATIENT
Start: 2025-08-29